# Patient Record
Sex: FEMALE | Race: WHITE
[De-identification: names, ages, dates, MRNs, and addresses within clinical notes are randomized per-mention and may not be internally consistent; named-entity substitution may affect disease eponyms.]

---

## 2017-10-03 ENCOUNTER — HOSPITAL ENCOUNTER (OUTPATIENT)
Dept: HOSPITAL 47 - RADMAMWWP | Age: 52
Discharge: HOME | End: 2017-10-03
Payer: COMMERCIAL

## 2017-10-03 ENCOUNTER — HOSPITAL ENCOUNTER (OUTPATIENT)
Dept: HOSPITAL 47 - RADUSWWP | Age: 52
Discharge: HOME | End: 2017-10-03
Payer: COMMERCIAL

## 2017-10-03 DIAGNOSIS — Z88.0: ICD-10-CM

## 2017-10-03 DIAGNOSIS — Z12.31: Primary | ICD-10-CM

## 2017-10-03 DIAGNOSIS — R94.5: Primary | ICD-10-CM

## 2017-10-03 PROCEDURE — 76705 ECHO EXAM OF ABDOMEN: CPT

## 2017-10-03 NOTE — US
EXAMINATION TYPE: US liver

 

DATE OF EXAM: 10/3/2017

 

COMPARISON: NONE

 

CLINICAL HISTORY: R94.5 ELevated liver function test. Elevated LFT's

 

EXAM MEASUREMENTS:

 

Liver Length:  14.2 cm   

Gallbladder Wall:  0.2 cm   

CBD:  0.4 cm

Right Kidney:  11.3 x 4.7 x 4.3 cm

 

 

 

Pancreas:  wnl, tail obscured by bowel gas

Liver:  Difficult to penetrates, bright echotexture, heterogeneous   

Gallbladder:  wnl

**Evidence for sonographic Rosario's sign:  No

CBD:  wnl 

Right Kidney:  wnl 

 

 

 

IMPRESSION: 

1. Liver is heterogeneous in pattern correlate for hepatitis, hepatocellular disease or fatty infiltr
ation.

## 2017-10-04 NOTE — MM
Reason for exam: screening  (asymptomatic).



History:

Family history of breast cancer in paternal aunt and breast cancer in paternal 

grandmother.



Physical Findings:

A clinical breast exam by your physician is recommended on an annual basis and 

results should be correlated with mammographic findings.



MG Screening Mammo w CAD

Bilateral CC and MLO view(s) were taken.

No prior studies available for comparison.

The breast tissue is heterogeneously dense. This may lower the sensitivity of 

mammography.  There is a 6.8mm mass in the upper outer quadrant on right breast 

8-9cm from nipple at middle depth. A 4mm mass in the lowe inner quadrant on left 

breast 6cm from nipple at middle depth.





ASSESSMENT: Incomplete: need additional imaging evaluation, BI-RAD 0



RECOMMENDATION:

Special view mammogram of both breasts.



If lesion persists on supplemental views, image directed ultrasound is 

recommended.



[facility] will attempt to contact patient to return for supplemental 

views and ultrasound if indicated.

## 2017-10-16 ENCOUNTER — HOSPITAL ENCOUNTER (OUTPATIENT)
Dept: HOSPITAL 47 - RADMAMWWP | Age: 52
End: 2017-10-16
Payer: COMMERCIAL

## 2017-10-16 DIAGNOSIS — R92.8: Primary | ICD-10-CM

## 2017-10-16 PROCEDURE — 76642 ULTRASOUND BREAST LIMITED: CPT

## 2017-10-16 NOTE — USB
Reason for exam: additional evaluation requested from abnormal screening.



History:

Family history of breast cancer in paternal aunt and breast cancer in paternal 

grandmother.



US Breast Workup Limited LORI

Technologist: Suze Padilla

Right breast ultrasound demonstrates a 0.7 x 0.6 x 0.9cm oval, cystic cluster at 9

o'clock versus complex cyst for which a 6 month follow up is recommended, 

corresponds to the mammographic findings, a 0.7 x 0.4 x 0.8cm oval, benign, cystic

lesion at 10 o'clock and a 0.2 x 0.2 x 0.3cm oval lesion too small to 

characterize at 12 o'clock.



Left breast ultrasound demonstrates a 0.6 x 0.3 x 0.6cm oval, circumscribed, 

hypoechoic lesion at 8 o'clock, possibly cystic with internal debris for which a 6

month follow up is recommended, corresponds to the mammographic findings.



These results were verbally communicated with the patient and result sheet given 

to the patient on 10/16/17.





ASSESSMENT: Probably benign, BI-RAD 3



RECOMMENDATION:

Follow-up diagnostic mammogram of both breasts in 6 months.

## 2017-10-16 NOTE — MM
Reason for exam: additional evaluation requested from abnormal screening.

Last mammogram was performed less than 1 month ago.



History:

Family history of breast cancer in paternal aunt and breast cancer in paternal 

grandmother.



Physical Findings:

Nurse did not find any significant physical abnormalities on exam.



MG Work Up Mamm w CAD BILAT

Bilateral spot compression CC, spot compression MLO, and LM view(s) were taken.

Prior study comparison: October 3, 2017, bilateral MG screening mammo w CAD.

The breast tissue is heterogeneously dense. This may lower the sensitivity of 

mammography.  On the right, 7mm nodularity persists at a middle depth. On the 

left, 7mm ovoid nodularity persists medially on the CC view, possible in the lower

inner quadrant based on the spot MLO view.



These results were verbally communicated with the patient and result sheet given 

to the patient on 10/16/17.





ASSESSMENT: Incomplete: need additional imaging evaluation, BI-RAD 0



RECOMMENDATION:

Ultrasound of both breasts.

(right upper outer quadrant, left medial half)

## 2018-01-05 ENCOUNTER — HOSPITAL ENCOUNTER (INPATIENT)
Dept: HOSPITAL 47 - 3MHU | Age: 53
LOS: 5 days | Discharge: HOME | DRG: 885 | End: 2018-01-10
Payer: MEDICAID

## 2018-01-05 VITALS — BODY MASS INDEX: 27.2 KG/M2

## 2018-01-05 DIAGNOSIS — Z91.5: ICD-10-CM

## 2018-01-05 DIAGNOSIS — Z81.8: ICD-10-CM

## 2018-01-05 DIAGNOSIS — F41.1: ICD-10-CM

## 2018-01-05 DIAGNOSIS — Z79.51: ICD-10-CM

## 2018-01-05 DIAGNOSIS — Z79.899: ICD-10-CM

## 2018-01-05 DIAGNOSIS — R45.851: ICD-10-CM

## 2018-01-05 DIAGNOSIS — R00.1: ICD-10-CM

## 2018-01-05 DIAGNOSIS — F10.10: ICD-10-CM

## 2018-01-05 DIAGNOSIS — I10: ICD-10-CM

## 2018-01-05 DIAGNOSIS — K21.9: ICD-10-CM

## 2018-01-05 DIAGNOSIS — G43.909: ICD-10-CM

## 2018-01-05 DIAGNOSIS — F41.0: ICD-10-CM

## 2018-01-05 DIAGNOSIS — Z88.0: ICD-10-CM

## 2018-01-05 DIAGNOSIS — F33.2: Primary | ICD-10-CM

## 2018-01-05 PROCEDURE — 84443 ASSAY THYROID STIM HORMONE: CPT

## 2018-01-06 RX ADMIN — BUTALBITAL, ACETAMINOPHEN, AND CAFFEINE PRN EACH: 50; 325; 40 TABLET ORAL at 22:24

## 2018-01-06 RX ADMIN — PANTOPRAZOLE SODIUM PRN MG: 40 TABLET, DELAYED RELEASE ORAL at 15:36

## 2018-01-06 RX ADMIN — ACETAMINOPHEN PRN MG: 325 TABLET, FILM COATED ORAL at 22:31

## 2018-01-06 RX ADMIN — ACETAMINOPHEN PRN MG: 325 TABLET, FILM COATED ORAL at 01:58

## 2018-01-06 RX ADMIN — FLUTICASONE PROPIONATE SCH SPRAY: 50 SPRAY, METERED NASAL at 21:39

## 2018-01-06 RX ADMIN — BUTALBITAL, ACETAMINOPHEN, AND CAFFEINE PRN EACH: 50; 325; 40 TABLET ORAL at 14:56

## 2018-01-06 RX ADMIN — BISOPROLOL FUMARATE AND HYDROCHLOROTHIAZIDE SCH EACH: 6.25; 1 TABLET ORAL at 15:34

## 2018-01-06 RX ADMIN — ACETAMINOPHEN PRN MG: 325 TABLET, FILM COATED ORAL at 12:22

## 2018-01-06 NOTE — CONS
CONSULTATION



DATE OF CONSULTATION:

01/06/2018.



HISTORY OF PRESENT ILLNESS:

This 52-year-old woman who was admitted to [facility] with overdose of 
multiple

medications also had depression.  Patient complains of severe headache at this 
time.

The patient also complaining of hypertension.  Blood pressure is  Patient being

followed by Dr. Pop in the outpatient setting.  There is no history of fever, 
rigors

or chills.  No history of headache, loss of consciousness or seizures.



PAST MEDICAL:

Hypertension, history of migraines, headaches.



MEDICATIONS ARE:

Previous medications are:

1. Vistaril 25 mg q.6 hours.

2. Buspar 10 mg daily.

3. Prilosec 20 mg b.i.d. p.r.n.

4. Flonase 1 spray b.i.d.

5. Lexapro 20 mg daily.

6. Ziac 1 p.o. daily.



ALLERGIES:

None.



FAMILY HISTORY:

No history of heart disease or strokes in the family.



SOCIAL HISTORY:

No history of smoking, but occasional alcohol intake.  History of binge 
drinking.



REVIEW OF SYSTEMS:

ENT: No diminished vision. No diminished hearing.  Cardiovascular: S1, S2 
muffled.

Respiratory: As mentioned earlier.  GI: As mentioned earlier. : No dysuria.

Allergy/Immunology: No asthma or hayfever.  Musculoskeletal: As mentioned 
earlier.

Hematology/Oncology: No history of anemia.  Endocrine: No history of diabetes or

hypothyroidism.  Constitutional: As mentioned earlier.  Dermatology: Negative.

Rheumatology: Negative.  Psychiatric: As mentioned earlier.



PHYSICAL EXAMINATION:

The patient is alert and oriented times three.  Pulse is 49, blood pressure 120/
77,

respiration 17, temperature 98 degrees, pulse ox normal.  HEENT:  Conjunctivae 
normal.

Oral mucosa moist.  Neck is no jugular venous distention.  No carotid bruit. No 
lymph

nodes enlargement. Cardiovascular system:  S1 S2. No S3, no S4.  Respiratory: 
Breath

sounds diminished in the bases.  No rhonchi and no crackles.

ABDOMEN:  Soft, nontender.  No mass palpable. Legs no edema and no swelling.  
Central

nervous system: Higher functions as mentioned earlier, moves all 4 limbs, no 
edema.  No

focal motor or sensory deficits. Lymphatics: No lymph nodes palpable in the neck
,

axillae or groin.

SKIN:  No ulcer, rash or bleeding.



LABS:

Pending at this time.



ASSESSMENT:

1. Status post overdose of multiple medications.

2. Depression.

3. Hypertension.

4. Headaches, possibly migraine.

5. Bradycardia.



RECOMMENDATIONS AND DISCUSSION:

In this 52-year-old woman who presented with multiple complex medical issues.  
At this

time I recommend to continue current management and symptomatic treatment. 
Otherwise at

this time I recommend to monitor blood pressure closely.  Resume the home 
medications.

Otherwise,  I would also recommend  Fioricet for headaches.  Basic labs.  EKG 
for sinus

bradycardia.  Otherwise we will follow the patient closely with you.



Thank you Dr. Moreno for letting us participate in the care of this patient.





MMFERL / MARIA ESTHERN: [id number] / Job#: [id number]

[email or web address]
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
## 2018-01-07 RX ADMIN — FLUTICASONE PROPIONATE SCH SPRAY: 50 SPRAY, METERED NASAL at 20:53

## 2018-01-07 RX ADMIN — BISOPROLOL FUMARATE AND HYDROCHLOROTHIAZIDE SCH: 6.25; 1 TABLET ORAL at 13:52

## 2018-01-07 RX ADMIN — FLUTICASONE PROPIONATE SCH SPRAY: 50 SPRAY, METERED NASAL at 08:43

## 2018-01-07 RX ADMIN — BUTALBITAL, ACETAMINOPHEN, AND CAFFEINE PRN EACH: 50; 325; 40 TABLET ORAL at 08:43

## 2018-01-07 RX ADMIN — SERTRALINE HYDROCHLORIDE SCH MG: 50 TABLET, FILM COATED ORAL at 08:43

## 2018-01-07 RX ADMIN — BUTALBITAL, ACETAMINOPHEN, AND CAFFEINE PRN EACH: 50; 325; 40 TABLET ORAL at 15:56

## 2018-01-07 RX ADMIN — DOXEPIN HYDROCHLORIDE SCH MG: 10 CAPSULE ORAL at 20:54

## 2018-01-08 RX ADMIN — ACETAMINOPHEN PRN MG: 325 TABLET, FILM COATED ORAL at 15:03

## 2018-01-08 RX ADMIN — SERTRALINE HYDROCHLORIDE SCH MG: 50 TABLET, FILM COATED ORAL at 09:02

## 2018-01-08 RX ADMIN — BISOPROLOL FUMARATE AND HYDROCHLOROTHIAZIDE SCH: 6.25; 1 TABLET ORAL at 09:01

## 2018-01-08 RX ADMIN — FLUTICASONE PROPIONATE SCH SPRAY: 50 SPRAY, METERED NASAL at 09:02

## 2018-01-08 RX ADMIN — BUTALBITAL, ACETAMINOPHEN, AND CAFFEINE PRN EACH: 50; 325; 40 TABLET ORAL at 19:09

## 2018-01-08 RX ADMIN — PANTOPRAZOLE SODIUM PRN MG: 40 TABLET, DELAYED RELEASE ORAL at 09:02

## 2018-01-08 RX ADMIN — BUTALBITAL, ACETAMINOPHEN, AND CAFFEINE PRN EACH: 50; 325; 40 TABLET ORAL at 09:03

## 2018-01-08 RX ADMIN — FLUTICASONE PROPIONATE SCH SPRAY: 50 SPRAY, METERED NASAL at 20:46

## 2018-01-08 RX ADMIN — DOXEPIN HYDROCHLORIDE SCH MG: 10 CAPSULE ORAL at 20:46

## 2018-01-08 NOTE — P.PN
Progress Note - Text





Interval history: The patient is found at the  she follows me to an 

interview room.  She reports she still has depressed mood with anxiety symptoms 

and hopelessness thinking.  She feels the doxepin did help somewhat with sleep 

in that she still frequently woke but she was able to fall sleep quicker.  

Staff reported that she slept 6 hours she states she would agree although it 

was fragmented.  Appetite is stable.  She is troubled by trying to plan for her 

future.  Financially she now she needs to get another job but also has an 

interest in going back to school as she has tried that several times but did 

not complete.  She reports attending groups.





Mental status exam: The patient is alert she is dressed in her own clothing and 

a hospital gown.  Eye contact is appropriate she is cooperative.  She endorses 

a depressed mood with hopelessness thinking affect is constricted.  She feels 

safe in the hospital and does not feel she would harm herself here she is 

endorsing no homicidal ideation.  She is endorsing no auditory or visual 

hallucinations or specific delusions there is no evidence of hypomania or 

joe.  Insight and judgment grossly intact.  She is oriented to person place 

and date.  She demonstrates no verbal or physical aggressiveness.





Plan: The patient will continue on the Zoloft we will plan to titrate this to 

100 mg beginning tomorrow.  Continue doxepin is written.  We will continue to 

monitor for safety and encourage her full participation in the milieu.  The 

patient reports having a pleasant conversation with her daughter last evening.  

Social work will be asked to arrange a support meeting.  Vital signs reviewed 

she does demonstrate bradycardia she states that this is been going on for 

quite some time she endorses no dizziness.

## 2018-01-09 VITALS — TEMPERATURE: 98.5 F | RESPIRATION RATE: 16 BRPM

## 2018-01-09 RX ADMIN — FLUTICASONE PROPIONATE SCH SPRAY: 50 SPRAY, METERED NASAL at 08:48

## 2018-01-09 RX ADMIN — BUTALBITAL, ACETAMINOPHEN, AND CAFFEINE PRN EACH: 50; 325; 40 TABLET ORAL at 08:48

## 2018-01-09 RX ADMIN — BUTALBITAL, ACETAMINOPHEN, AND CAFFEINE PRN EACH: 50; 325; 40 TABLET ORAL at 16:17

## 2018-01-09 RX ADMIN — SERTRALINE HYDROCHLORIDE SCH MG: 100 TABLET ORAL at 08:48

## 2018-01-09 RX ADMIN — FLUTICASONE PROPIONATE SCH SPRAY: 50 SPRAY, METERED NASAL at 20:48

## 2018-01-09 RX ADMIN — BISOPROLOL FUMARATE AND HYDROCHLOROTHIAZIDE SCH EACH: 6.25; 1 TABLET ORAL at 08:48

## 2018-01-09 NOTE — P.PN
Progress Note - Text





Interval history: The patient is found in group she follows me to an interview 

room.  She reports experiencing some anxiety at times which appear to be less 

than panic attacks.  We discussed strategies and coping skills to manage those.

  She continues to describe her disappointment in that she has to live with her 

mother she has no transportation or job.  She demonstrates future oriented 

thinking as to how she will accomplish these tasks.  She has been attending 

groups.  We again discussed her psychotropic medication.  She feels that 

doxepin has provided some relief for sleep but she had difficulty last evening 

sleeping.





Mental status exam: The patient is alert she is dressed in hospital attire.  

Eye contact is appropriate speech is fluent spontaneous nonpressured.  She 

states her mood has been anxious.  She feels depression is improving.  She is 

reporting no suicidal or homicidal ideation intent or plan.  She is endorsing 

no symptoms of psychosis and she does not appear hypomanic or manic.  There is 

no evidence of tangential thinking loose associations or flight of ideas.  

Insight and judgment improving.  She remains oriented to person place and date.





Plan: The patient will continue on her current medications I will titrate the 

doxepin to 20 mg at bedtime.  We discussed the plan of discharging her from the 

mental health unit tomorrow to begin partial hospitalization at [facility] Thursday.  We discussed that as a treatment option and she is 

agreeable.  We will continue to monitor her for safety.  Vital signs reviewed.

## 2018-01-10 VITALS — DIASTOLIC BLOOD PRESSURE: 65 MMHG | SYSTOLIC BLOOD PRESSURE: 105 MMHG | HEART RATE: 53 BPM

## 2018-01-10 RX ADMIN — BUTALBITAL, ACETAMINOPHEN, AND CAFFEINE PRN EACH: 50; 325; 40 TABLET ORAL at 08:35

## 2018-01-10 RX ADMIN — BISOPROLOL FUMARATE AND HYDROCHLOROTHIAZIDE SCH: 6.25; 1 TABLET ORAL at 08:33

## 2018-01-10 RX ADMIN — FLUTICASONE PROPIONATE SCH SPRAY: 50 SPRAY, METERED NASAL at 08:32

## 2018-01-10 RX ADMIN — SERTRALINE HYDROCHLORIDE SCH MG: 100 TABLET ORAL at 08:33

## 2018-01-10 NOTE — P.DS
Providers


Date of admission: 


01/06/18 00:13





Expected date of discharge: 01/10/18


Attending physician: 


Zacarias Moreno





Consults: 





 





01/06/18 01:50


Consult Physician Routine 


   Consulting Provider: Drew Betancur


   Consult Reason/Comments: medical management


   Do you want consulting provider notified?: Yes, Notify in am











Primary care physician: 


Stated None








- Discharge Diagnosis(es)


(1) Major depressive disorder, recurrent severe without psychotic features


Current Visit: Yes   Status: Acute   Priority: High   





(2) Alcohol use disorder


Current Visit: Yes   Status: Acute   Priority: High   


Hospital Course: 





Brief summary of admission note: This patient is a 52-year-old  

 female who was admitted to the mental health unit from [facility] 

after attempting suicide via medication overdose.  She reportedly overdosed 

with Lexapro Vistaril and BuSpar.  The patient reported she was feeling 

overwhelmed.  Recently she had broken up with her fianc and this led to her 

losing her job housing and her vehicle.  She reported impaired sleep and 

appetite energy and was feeling hopeless.  She endorsed generalized anxiety 

symptoms as well as panic attacks.  For full details please refer to my 

psychiatric evaluation dated 01/06/2018.





Summary of hospital course: The patient was admitted to the mental health unit 

she signed in voluntarily.  We reviewed her presenting symptoms and medication 

options.  Be initiated Zoloft and titrated that 100 mg daily.  Vistaril was 

used as needed for anxiety doxepin was used for sleep disturbance.  The patient 

attended group she demonstrated no agitated behavior.  She reported a 

progressive improvement of her symptoms while here.  She still is experiencing 

some anxiety related to her current living situation and lack of financial 

resources but states her suicidal ideation has resolved.  We discussed a plan 

of having her transition to the [facility] program after 

discharge and she is agreeable.





Mental status exam: The patient is alert she seated calmly she is dressed in 

her own clothing today.  Hygiene grooming are adequate.  She reports her moods 

improved.  She is reporting no suicidal or homicidal ideation intent or plan.  

She does have some ongoing symptoms of anxiety.  She is endorsing no auditory 

or visual hallucinations or any specific delusions.  There is no observable 

evidence of psychosis.  Thought process is linear she demonstrates no 

tangential thinking loose associations or flight of ideas she does not appear 

hypomanic or manic.  Her affect is appropriately expressive and she 

demonstrates an appropriate range of affect.  She is oriented to person place 

and date.  She demonstrates no verbal or physical aggressiveness.  She 

demonstrates future oriented thinking.





Impressions


1.  Major depressive disorder recurrent severe without psychosis, alcohol use 

disorder, rule out generalized anxiety disorder


2.  Rule out cluster B traits


3.  Previous reported history of hypertension gastric receptor reflux disease 

and migraines





Plan: The patient will be discharged mental health unit today.  She will return 

residing with her mother.  We have made arrangements for her to begin the 

[facility] program at [facility] for continued care.  There is no 

imminent safety risk she does not require continued inpatient psychiatric 

hospitalization.  We will continue Zoloft 100 mg daily, doxepin 20 mg at 

bedtime if needed, Vistaril 25 mg up to twice daily if needed for anxiety.  She 

will follow-up with her primary care physician as needed.  She is instructed to 

abstain from use of alcohol or any other illicit drug as use of these 

substances would elevate her safety risk.  She does not wish to attend 

inpatient chemical dependency treatment at this time.  She will continue to 

address substance use issues as an outpatient.  She is instructed to return to 

the hospital with any acute safety concerns.





Plan - Discharge Summary


New Discharge Prescriptions: 


New


   Acetaminophen Tab [Tylenol] 650 mg PO Q4HR PRN  tab


     PRN Reason: Pain/Discomfort


   Doxepin [SINEquan] 20 mg PO HS #30 cap


   hydrOXYzine PAMOATE [Vistaril] 25 mg PO BID PRN #30 cap


     PRN Reason: Anxiety


   Sertraline [Zoloft] 100 mg PO DAILY #30 tab





Continue


   Bisoprolol-Hctz 10-6.25 mg [Ziac 10-6.25 MG] 1 tab PO DAILY


   Omeprazole [PriLOSEC] 20 mg PO AC-BID PRN


     PRN Reason: Heartburn


   Fluticasone Nasal Spray [Flonase Nasal Spray] 1 spray EA NOSTRIL BID





Discontinued


   hydrOXYzine PAMOATE [Vistaril] 25 mg PO HS


   busPIRone HCl [Buspar] 10 mg PO TID


   Escitalopram [Lexapro] 20 mg PO DAILY


Discharge Medication List





Bisoprolol-Hctz 10-6.25 mg [Ziac 10-6.25 MG] 1 tab PO DAILY 01/06/18 [History]


Fluticasone Nasal Spray [Flonase Nasal Spray] 1 spray EA NOSTRIL BID 01/06/18 [

History]


Omeprazole [PriLOSEC] 20 mg PO AC-BID PRN 01/06/18 [History]


Acetaminophen Tab [Tylenol] 650 mg PO Q4HR PRN  tab 01/10/18 [Rx]


Doxepin [SINEquan] 20 mg PO HS #30 cap 01/10/18 [Rx]


Sertraline [Zoloft] 100 mg PO DAILY #30 tab 01/10/18 [Rx]


hydrOXYzine PAMOATE [Vistaril] 25 mg PO BID PRN #30 cap 01/10/18 [Rx]








Follow up Appointment(s)/Referral(s): 


intake,intake [Other] - 01/11/18 8:00 am

## 2018-04-20 ENCOUNTER — HOSPITAL ENCOUNTER (OUTPATIENT)
Dept: HOSPITAL 47 - RADMAMWWP | Age: 53
Discharge: HOME | End: 2018-04-20
Payer: COMMERCIAL

## 2018-04-20 DIAGNOSIS — R92.8: Primary | ICD-10-CM

## 2018-04-20 PROCEDURE — 76641 ULTRASOUND BREAST COMPLETE: CPT

## 2018-04-20 PROCEDURE — 77066 DX MAMMO INCL CAD BI: CPT

## 2018-04-20 NOTE — MM
Reason for exam: follow-up at short interval from prior study.

Last mammogram was performed 6 months ago.



History:

Family history of breast cancer in paternal aunt and breast cancer in paternal 

grandmother.



Physical Findings:

Nurse did not find any significant physical abnormalities on exam.



MG 3D Diag Mammo W/Cad LORI

Bilateral CC and MLO view(s) were taken.

Prior study comparison: October 16, 2017, bilateral MG work up mamm w CAD BILAT.  

October 3, 2017, bilateral MG screening mammo w CAD.

The breast tissue is heterogeneously dense. This may lower the sensitivity of 

mammography.  There is chronic nodularity bilaterally.



These results were verbally communicated with the patient and result sheet given 

to the patient on 4/20/18.





ASSESSMENT: Benign, BI-RAD 2



RECOMMENDATION:

Routine screening mammogram of both breasts in 1 year.

## 2018-04-20 NOTE — USB
Reason for exam: follow-up at short interval from prior study.



History:

Family history of breast cancer in paternal aunt and breast cancer in paternal 

grandmother.



US Breast BILAT

Right breast ultrasound includes all four quadrants, the retroareolar region and 

axilla. Finding demonstrates a 0.3 x 0.3 x 0.3cm lesion too small to characterize 

at 12 o'clock, a 0.6 x 0.4 x 0.4cm lesion too small to characterize at 8 o'clock, 

a 0.3 x 0.3 x 0.3cm lesion too small to characterize at 8 o'clock, a 0.9 x 0.5 x 

1.0cm stable cystic cluster at 9 o'clock and a 0.4 x 0.2 x 0.6cm cystic lesion at 

10 o'clock.



Left breast ultrasound includes all four quadrants, the retroareolar region and 

axilla. Finding demonstrates a 0.6 x 0.3 x 0.4cm lesion too small to characterize 

at 8 o'clock.



These results were verbally communicated with the patient and result sheet given 

to the patient on 4/20/18.





ASSESSMENT: Benign, BI-RAD 2



RECOMMENDATION:

Routine screening mammogram of both breasts in 1 year.

## 2018-04-24 ENCOUNTER — HOSPITAL ENCOUNTER (INPATIENT)
Dept: HOSPITAL 47 - EC | Age: 53
LOS: 6 days | Discharge: NURSING, REHAB OR LONG-TERM CARE | DRG: 494 | End: 2018-04-30
Payer: COMMERCIAL

## 2018-04-24 DIAGNOSIS — H00.019: ICD-10-CM
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
## 2018-04-24 DIAGNOSIS — F17.210: ICD-10-CM
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
## 2018-04-24 DIAGNOSIS — I10: ICD-10-CM

## 2018-04-24 DIAGNOSIS — F31.9: ICD-10-CM

## 2018-04-24 DIAGNOSIS — Z88.0: ICD-10-CM

## 2018-04-24 DIAGNOSIS — S82.852A: Primary | ICD-10-CM

## 2018-04-24 DIAGNOSIS — Z79.899: ICD-10-CM

## 2018-04-24 PROCEDURE — 82306 VITAMIN D 25 HYDROXY: CPT

## 2018-04-24 PROCEDURE — 85025 COMPLETE CBC W/AUTO DIFF WBC: CPT

## 2018-04-24 PROCEDURE — 80053 COMPREHEN METABOLIC PANEL: CPT

## 2018-04-24 PROCEDURE — 86900 BLOOD TYPING SEROLOGIC ABO: CPT

## 2018-04-24 PROCEDURE — 86850 RBC ANTIBODY SCREEN: CPT

## 2018-04-24 PROCEDURE — 99152 MOD SED SAME PHYS/QHP 5/>YRS: CPT

## 2018-04-24 PROCEDURE — 96374 THER/PROPH/DIAG INJ IV PUSH: CPT

## 2018-04-24 PROCEDURE — 85610 PROTHROMBIN TIME: CPT

## 2018-04-24 PROCEDURE — 96375 TX/PRO/DX INJ NEW DRUG ADDON: CPT

## 2018-04-24 PROCEDURE — 27818 TREATMENT OF ANKLE FRACTURE: CPT

## 2018-04-24 PROCEDURE — 2W6RXZZ TRACTION OF LEFT LOWER LEG: ICD-10-PCS

## 2018-04-24 PROCEDURE — 85730 THROMBOPLASTIN TIME PARTIAL: CPT

## 2018-04-24 PROCEDURE — 99153 MOD SED SAME PHYS/QHP EA: CPT

## 2018-04-24 PROCEDURE — 86901 BLOOD TYPING SEROLOGIC RH(D): CPT

## 2018-04-24 PROCEDURE — 81025 URINE PREGNANCY TEST: CPT

## 2018-04-24 PROCEDURE — 99285 EMERGENCY DEPT VISIT HI MDM: CPT

## 2018-04-24 PROCEDURE — 96376 TX/PRO/DX INJ SAME DRUG ADON: CPT

## 2018-04-24 RX ADMIN — KETAMINE HYDROCHLORIDE ONE MG: 10 INJECTION, SOLUTION INTRAMUSCULAR; INTRAVENOUS at 23:34

## 2018-04-24 RX ADMIN — KETAMINE HYDROCHLORIDE ONE MG: 10 INJECTION, SOLUTION INTRAMUSCULAR; INTRAVENOUS at 22:56

## 2018-04-24 RX ADMIN — KETAMINE HYDROCHLORIDE ONE MG: 10 INJECTION, SOLUTION INTRAMUSCULAR; INTRAVENOUS at 23:38

## 2018-04-24 RX ADMIN — MORPHINE SULFATE PRN MG: 5 INJECTION, SOLUTION INTRAVENOUS at 23:52

## 2018-04-24 NOTE — XR
EXAMINATION TYPE: XR ankle limited LT

 

DATE OF EXAM: 4/24/2018

 

COMPARISON: NONE

 

HISTORY: Ankle pain

 

TECHNIQUE: 2 views

 

FINDINGS: There is a comminuted fracture of the ankle joint involving the distal fibula and tibia. Th
ere is a posterior partial dislocation of the talus. There is probably a large fragment of the poster
ior malleolus. There is a transverse fracture of the medial malleolus which shows lateral displacemen
t. This is adjacent to the talus.

 

IMPRESSION: There is a comminuted trimalleolar posterior fracture dislocation of the ankle joint.

## 2018-04-24 NOTE — ED
General Adult HPI





<Antonio Biggs - Last Filed: 04/25/18 00:43>





- General


Source: patient, RN notes reviewed


Mode of arrival: EMS


Limitations: physical limitation





<Alo Levin - Last Filed: 04/25/18 01:00>





- General


Chief complaint: Extremity Injury, Lower


Stated complaint: FALL,ANKLE INJURY


Time Seen by Provider: 04/24/18 22:22





- History of Present Illness


Initial comments: 





Patient is a 52-year-old female presenting to the emergency room today with 

chief complaint of a injury to the left ankle.  She does admit that she was at 

work.  She states that a [employer] was coming she went to step back forgot that 

there was a pallet there and twisted and fell backwards.  She doesn't pain 

locally to left ankle.  EMS was called they did give pain medication.  Currently

, what this time does not want any more pain medicine at this time.  Patient 

states that she has sensation.  She denies any other injury or complaint. 

Patient denies any recent fever, chills, shortness of breath, chest pain, back 

pain, abdominal pain, nausea or vomiting, numbness or tingling, headaches or 

visual changes, or any other complaints. (Alo Levin)





- Related Data


 Home Medications











 Medication  Instructions  Recorded  Confirmed


 


Omeprazole [PriLOSEC] 20 mg PO AC-BID PRN 01/06/18 04/24/18


 


QUEtiapine FUMARATE [SEROquel] 200 mg PO HS 04/24/18 04/24/18


 


Sertraline [Zoloft] 150 mg PO DAILY 04/24/18 04/24/18


 


hydrOXYzine PAMOATE [Vistaril] 25 mg PO BID 04/24/18 04/24/18











 Allergies











Allergy/AdvReac Type Severity Reaction Status Date / Time


 


Penicillins AdvReac  Rash/Hives Verified 04/24/18 23:20














Review of Systems


ROS Other: All systems not noted in ROS Statement are negative.





<Antonio Biggs - Last Filed: 04/25/18 00:43>


ROS Other: All systems not noted in ROS Statement are negative.





<Alo Levin - Last Filed: 04/25/18 01:00>


ROS Statement: 


Those systems with pertinent positive or pertinent negative responses have been 

documented in the HPI.








Past Medical History


Past Medical History: Hypertension


History of Any Multi-Drug Resistant Organisms: None Reported


Additional Past Surgical History / Comment(s): umblical surgery repair.


Past Psychological History: Bipolar


Smoking Status: Current some day smoker


Past Alcohol Use History: Occasional


Past Drug Use History: None Reported





<Alo Levin - Last Filed: 04/25/18 01:00>





General Exam





<Antonio Biggs - Last Filed: 04/25/18 00:43>


Limitations: physical limitation





<Alo Levin - Last Filed: 04/25/18 01:00>





- General Exam Comments


Initial Comments: 





General:  The patient is awake and alert, in no distress, and does not appear 

acutely ill.   


Neck:  The neck is supple.  


Cardiovascular:  There is a regular rate and rhythm. No murmur, rub or gallop 

is appreciated.


Respiratory:  Lungs are clear to auscultation, respirations are non-labored, 

breath sounds are equal.  No wheezes, stridor, rales, or rhonchi.


Musculoskeletal: Patient does have obvious deformity with of the left ankle 

with lateral deviation.  Patient has no tenderness to the right knee or over 

the fibular head.  Sensations are intact to light touch.  Pulses 2+.


Neurological:  A&O x 3. CN II-XII intact, There are no obvious motor or sensory 

deficits. Coordination appears grossly intact. Speech is normal.


Skin:  Skin is warm and dry and no rashes or lesions are noted. 


Psychiatric:  Normal mood and affect.   (Alo Levin)


 Vital Signs











  04/24/18 04/24/18 04/24/18





  22:20 22:55 22:59


 


Temperature 97.5 F L  


 


Pulse Rate 63 67 73


 


Respiratory 18 18 18





Rate   


 


Blood Pressure 177/91 162/95 187/100


 


O2 Sat by Pulse 100 100 100





Oximetry   














  04/24/18 04/24/18 04/24/18





  23:03 23:07 23:19


 


Temperature   


 


Pulse Rate 79 86 63


 


Respiratory 18 18 18





Rate   


 


Blood Pressure 180/95 193/103 175/84


 


O2 Sat by Pulse 100 100 100





Oximetry   














  04/24/18 04/24/18 04/24/18





  23:27 23:33 23:36


 


Temperature   


 


Pulse Rate 60 60 72


 


Respiratory 18 18 18





Rate   


 


Blood Pressure 192/91 176/81 174/98


 


O2 Sat by Pulse 100 100 100





Oximetry   














  04/24/18 04/24/18 04/24/18





  23:39 23:48 23:57


 


Temperature   


 


Pulse Rate 87 81 73


 


Respiratory 20 18 18





Rate   


 


Blood Pressure 210/121 181/113 190/97


 


O2 Sat by Pulse 100 100 100





Oximetry   














Procedures





- Orthopedic Fracture Reduction


  ** Fracture #1


Consent Obtained: written consent


Time Out Performed: Yes


Side: left


Fracture Reduction Location: tibia, fibula


Analgesia: procedural sedation


Technique: direct manipulation, traction/counter-traction


Post Reduction X-rays Demonstrate: acceptable reduction


Post-Reduction Neuro Exam: intact


Post-Reduction Vascular Exam: intact


Splint Applied: Yes


Patient Tolerated Procedure: well





- Orthopedic Splinting/Casting


  ** Injury #1


Side: left


Lower Extremity Injury Location: ankle


Lower Extremity Immobilizer: posterior splint, stirrup splint





- Procedural Sedation


Procedural Sedation Start Time: 22:56


Procedural Sedation Stop Time: 23:55


Indications: fracture/dislocation reduction


ASA Class: I


Mallampati Airway Score: 1


Preparation: cardiac monitor applied, pulse oximeter, capnometry used, 

supplemental O2 applied, suction/airway equipment at bedside


Ketamine: IV


Ketamine Dose: 170


Complications: none


Patient Tolerated Procedure: well





<Antonio Biggs - Last Filed: 04/25/18 00:43>





<Alo Levin - Last Filed: 04/25/18 01:00>





- Orthopedic Fracture Reduction


  ** Fracture #1


Additional Comments: 





Patient has weak DP pulse, good cap refill, neurologically intact. (Antonio Biggs)





Medical Decision Making





<Antonio Biggs - Last Filed: 04/25/18 00:43>





<Alo Levin - Last Filed: 04/25/18 01:00>





- Medical Decision Making





Patient's x-rays do show a trimalleolar fracture or dislocation.  Reduction was 

attempted here in the emergency room with attending physician Dr. Biggs.  

Reduction was unsuccessful.  Case discussed with orthopedic on call Dr. Spencer who will admit the patient.  Patient will remain nothing by mouth. (

Alo Levin)





Disposition





<Antonio Biggs - Last Filed: 04/25/18 00:43>


Is patient prescribed a controlled substance at d/c from ED?: No


Time of Disposition: 00:42





<Alo Levin - Last Filed: 04/25/18 01:00>


Clinical Impression: 


 Ankle fracture





Disposition: ADMITTED AS IP TO THIS [facility]: Stable


Referrals: 


Sam Pop MD [Primary Care Provider] - 1-2 days

## 2018-04-24 NOTE — XR
EXAMINATION TYPE: XR tibia fibula LT

 

DATE OF EXAM: 4/24/2018

 

COMPARISON: NONE

 

HISTORY: Pain

 

TECHNIQUE: 4 views

 

FINDINGS: The knee joint is intact. There is a trimalleolar fracture of the ankle joint which is desc
ribed on the ankle x-ray report. There is posterior dislocation of the talus. There is no sign of any
 joint effusion.

 

IMPRESSION: Trimalleolar posterior fracture dislocation of the ankle joint. Normal knee joint.

## 2018-04-24 NOTE — XR
EXAMINATION TYPE: XR ankle limited LT

 

DATE OF EXAM: 4/24/2018

 

COMPARISON: Today

 

HISTORY: Post reduction

 

TECHNIQUE: 2 views.

 

FINDINGS:

2 views are obtained through the cast. There is a persistent posterior dislocation of the talus. Ther
e is slight improved position of the medial malleolus fracture fragment.

 

IMPRESSION: Slight reduction. There is still persistent significant posterior dislocation of the ankl
e joint. Trimalleolar fracture.

## 2018-04-25 LAB
ALBUMIN SERPL-MCNC: 4.2 G/DL (ref 3.5–5)
ALP SERPL-CCNC: 85 U/L (ref 38–126)
ALT SERPL-CCNC: 19 U/L (ref 9–52)
ANION GAP SERPL CALC-SCNC: 14 MMOL/L
APTT BLD: 24.1 SEC (ref 22–30)
AST SERPL-CCNC: 27 U/L (ref 14–36)
BASOPHILS # BLD AUTO: 0 K/UL (ref 0–0.2)
BASOPHILS # BLD AUTO: 0 K/UL (ref 0–0.2)
BASOPHILS NFR BLD AUTO: 0 %
BASOPHILS NFR BLD AUTO: 0 %
BUN SERPL-SCNC: 15 MG/DL (ref 7–17)
CALCIUM SPEC-MCNC: 9 MG/DL (ref 8.4–10.2)
CHLORIDE SERPL-SCNC: 103 MMOL/L (ref 98–107)
CO2 SERPL-SCNC: 23 MMOL/L (ref 22–30)
EOSINOPHIL # BLD AUTO: 0.2 K/UL (ref 0–0.7)
EOSINOPHIL # BLD AUTO: 0.2 K/UL (ref 0–0.7)
EOSINOPHIL NFR BLD AUTO: 2 %
EOSINOPHIL NFR BLD AUTO: 3 %
ERYTHROCYTE [DISTWIDTH] IN BLOOD BY AUTOMATED COUNT: 4.27 M/UL (ref 3.8–5.4)
ERYTHROCYTE [DISTWIDTH] IN BLOOD BY AUTOMATED COUNT: 4.67 M/UL (ref 3.8–5.4)
ERYTHROCYTE [DISTWIDTH] IN BLOOD: 13 % (ref 11.5–15.5)
ERYTHROCYTE [DISTWIDTH] IN BLOOD: 13.1 % (ref 11.5–15.5)
GLUCOSE SERPL-MCNC: 77 MG/DL (ref 74–99)
HCT VFR BLD AUTO: 38.3 % (ref 34–46)
HCT VFR BLD AUTO: 41.1 % (ref 34–46)
HGB BLD-MCNC: 12.9 GM/DL (ref 11.4–16)
HGB BLD-MCNC: 14 GM/DL (ref 11.4–16)
INR PPP: 1 (ref ?–1.2)
LYMPHOCYTES # SPEC AUTO: 1.3 K/UL (ref 1–4.8)
LYMPHOCYTES # SPEC AUTO: 2 K/UL (ref 1–4.8)
LYMPHOCYTES NFR SPEC AUTO: 22 %
LYMPHOCYTES NFR SPEC AUTO: 26 %
MCH RBC QN AUTO: 30 PG (ref 25–35)
MCH RBC QN AUTO: 30.3 PG (ref 25–35)
MCHC RBC AUTO-ENTMCNC: 33.8 G/DL (ref 31–37)
MCHC RBC AUTO-ENTMCNC: 34.1 G/DL (ref 31–37)
MCV RBC AUTO: 88.1 FL (ref 80–100)
MCV RBC AUTO: 89.6 FL (ref 80–100)
MONOCYTES # BLD AUTO: 0.4 K/UL (ref 0–1)
MONOCYTES # BLD AUTO: 0.5 K/UL (ref 0–1)
MONOCYTES NFR BLD AUTO: 6 %
MONOCYTES NFR BLD AUTO: 7 %
NEUTROPHILS # BLD AUTO: 4 K/UL (ref 1.3–7.7)
NEUTROPHILS # BLD AUTO: 4.8 K/UL (ref 1.3–7.7)
NEUTROPHILS NFR BLD AUTO: 64 %
NEUTROPHILS NFR BLD AUTO: 67 %
PLATELET # BLD AUTO: 202 K/UL (ref 150–450)
PLATELET # BLD AUTO: 237 K/UL (ref 150–450)
POTASSIUM SERPL-SCNC: 3.9 MMOL/L (ref 3.5–5.1)
PROT SERPL-MCNC: 7.2 G/DL (ref 6.3–8.2)
PT BLD: 9.9 SEC (ref 9–12)
SODIUM SERPL-SCNC: 140 MMOL/L (ref 137–145)
WBC # BLD AUTO: 6 K/UL (ref 3.8–10.6)
WBC # BLD AUTO: 7.6 K/UL (ref 3.8–10.6)

## 2018-04-25 PROCEDURE — 0QSH35Z REPOSITION LEFT TIBIA WITH EXTERNAL FIXATION DEVICE, PERCUTANEOUS APPROACH: ICD-10-PCS

## 2018-04-25 PROCEDURE — 0QSM35Z REPOSITION LEFT TARSAL WITH EXTERNAL FIXATION DEVICE, PERCUTANEOUS APPROACH: ICD-10-PCS

## 2018-04-25 RX ADMIN — ASPIRIN 325 MG ORAL TABLET SCH MG: 325 PILL ORAL at 20:45

## 2018-04-25 RX ADMIN — MORPHINE SULFATE PRN MG: 5 INJECTION, SOLUTION INTRAVENOUS at 04:10

## 2018-04-25 RX ADMIN — FENTANYL CITRATE ONE MCG: 50 INJECTION, SOLUTION INTRAMUSCULAR; INTRAVENOUS at 16:43

## 2018-04-25 RX ADMIN — HYDROCODONE BITARTRATE AND ACETAMINOPHEN PRN EACH: 5; 325 TABLET ORAL at 02:06

## 2018-04-25 RX ADMIN — HYDROCODONE BITARTRATE AND ACETAMINOPHEN PRN EACH: 5; 325 TABLET ORAL at 11:59

## 2018-04-25 RX ADMIN — CEFAZOLIN SCH GM: 10 INJECTION, POWDER, FOR SOLUTION INTRAVENOUS at 23:54

## 2018-04-25 RX ADMIN — MORPHINE SULFATE PRN MG: 5 INJECTION, SOLUTION INTRAVENOUS at 00:23

## 2018-04-25 RX ADMIN — POTASSIUM CHLORIDE SCH MLS/HR: 14.9 INJECTION, SOLUTION INTRAVENOUS at 17:24

## 2018-04-25 RX ADMIN — CEFAZOLIN SCH GM: 10 INJECTION, POWDER, FOR SOLUTION INTRAVENOUS at 17:23

## 2018-04-25 RX ADMIN — MORPHINE SULFATE PRN MG: 5 INJECTION, SOLUTION INTRAVENOUS at 08:22

## 2018-04-25 RX ADMIN — FENTANYL CITRATE ONE MCG: 50 INJECTION, SOLUTION INTRAMUSCULAR; INTRAVENOUS at 16:11

## 2018-04-25 RX ADMIN — MORPHINE SULFATE PRN MG: 5 INJECTION, SOLUTION INTRAVENOUS at 20:45

## 2018-04-25 RX ADMIN — SERTRALINE HYDROCHLORIDE SCH: 100 TABLET ORAL at 20:33

## 2018-04-25 NOTE — XR
EXAMINATION TYPE: XR ankle limited LT

 

DATE OF EXAM: 4/25/2018

 

COMPARISON: Today

 

HISTORY: Post reduction

 

TECHNIQUE: 2 views

 

FINDINGS: There is persistent posterior dislocation of the talus. There is comminuted trimalleolar fr
acture of the ankle. Only the medial malleolus fragment appears to be reduced slightly. There is stil
l significant posterior dislocation of the talus.

 

IMPRESSION: Very little improvement compared to initial exam.

## 2018-04-25 NOTE — CT
History preop planning. Ankle fracture.

 

Comparison ankle x-ray yesterday.

 

TECHNIQUE:

Multiple axial sections were obtained from the proximal tibia to the mid metatarsals with no contrast
. There are reconstructed images.

 

Findings

 

There is been reduction of the posterior dislocation of the talus compared to yesterday. There is a l
arge 2.5 cm chip fracture of the posterior malleolus which is displaced 5 mm. There is a comminuted f
racture of the distal fibula which is obliquely oriented and separation of the fragments up to 8 mm. 
There is a 2 cm large chip fracture of the medial malleolus. This is in good anatomic position and se
paration of 3 to 4 mm. There are pins in the proximal tibia as well as through the posterior calcaneu
s.

 

CONCLUSION:

There is good anatomic reduction of the trimalleolar fracture dislocation of the ankle joint. There i
s external fixation hardware in the proximal tibia and the posterior calcaneus. No fracture seen of t
he visualized bones of the foot.

## 2018-04-25 NOTE — P.OP
Date of Procedure: 04/25/18


Preoperative Diagnosis: 


1. Closed left trimalleolar ankle fracture dislocation


2.  Current cigarette smoker


3.  Bipolar disorder


Postoperative Diagnosis: 


Same


Procedure(s) Performed: 


1.  Application of left ankle spanning external fixator as part of a staged 

procedure


2.  Closed reduction left ankle fracture dislocation


Anesthesia: CHRISTINA


Surgeon: Gianfranco Spencer


Assistant #1: José Luis Taylor


Estimated Blood Loss (ml): 10


IV fluids (ml): 700


Pathology: none sent


Condition: stable


Disposition: PACU


Indications for Procedure: 


The patient is a very pleasant 52-year-old female with a medical history 

significant for cigarette smoking who is admitted with a left ankle fracture 

dislocation.  The patient was at work yesterday and was hit by [employer] and 

tripped over a skid of palates.  She had an isolated injury to her left ankle.  

There is obvious pain, deformity, and an inability to ambulate.  She was 

brought to the emergency department where 3 attempts at closed reduction and 

splinting were attempted by the emergency department physician.  The ankle was 

extremely unstable and was unable to be reduced.  She was placed in a splint 

and reduced.  I met with the patient preoperatively this morning to discuss 

closed reduction and application of an  external fixator in the operating room 

later today.  We discussed that this is part of a staged procedure and that she 

will still require definitive fixation.  We discussed the potential risks and 

complications of surgery including but not limited to risk of anesthesia, risk 

of superficial infection, risk of deep infection, risk of pin site infection, 

risk of intraoperative fracture, risk of postoperative fracture, risk of damage 

to local blood vessels or nerves, risk of DVT, risk of PE, and possibly loss of 

life or limb.  The patient voiced understanding of this and provided her verbal 

and written consent to go forward with surgery.


Description of Procedure: 


I met the patient in preoperative holding and discussed the procedure with her 

in detail.  The correct left leg was marked with my initials.  The patient was 

then brought back to the operating room.  She was positioned on the OR table 

where a general anesthetic and preoperative antibiotics were administered.  A 

timeout was then performed identifying the correct patient, operative extremity

, and procedure.  All bony prominences are well-padded.  The splint on the left 

ankle was removed.  There is diffuse swelling throughout the ankle and foot.  

There were serous filled fracture blisters diffusely over the lateral aspect of 

the ankle.  There was no skin at risk or areas of pressure necrosis.  There was 

no wrinkling of the skin diffusely throughout the ankle.  The left leg had a 

bump placed under the buttock internally rotating the leg to neutral.  A bone 

foam ramp was placed under the left leg elevating it.  The left leg was then 

prepped and draped in the standard sterile fashion.





I began by placing a centrally threaded calcaneal Steinmann pin through the 

calcaneus.  A small stab incision was made over the medial aspect of the 

posterior tuberosity of the calcaneus.  Dissection was carried down bluntly 

through the subcutaneous tissue with a hemostat.  A centrally threaded 7 mm 

Steinmann pin was placed by power from medial to lateral.  The lateral skin at 

a stab incision over the Steinmann pin as it broke to the skin.  A Steinmann 

pin was advanced until the threads were within the calcaneus.  I then placed 2 

partially threaded Steinmann pins in the tibia.  A stab incision was made over 

the proximal aspect of the tibia anteriorly.  Using a 3.5 mm drill bit a  

hole was made just medial to the crest of the tibia.  A terminally threaded 5 

mm Steinmann pin was placed by hand engaging both the anterior and posterior 

cortex of the tibia.  A clamp was then placed in the position of the second 

Steinmann pin was marked out.  Stab incision was made with a scalpel.  

Dissection was carried down bluntly through subcu tenderness tissue to 

hemostat.  A 3.5 mm drill bit was used to create a  hole.  A terminally 

threaded 5 mm Steinmann pin was placed by hand engaging both the anterior and 

posterior cortex of the tibia.  A clamp was placed on these 2 Steinmann pins.  

Pin-to-bar clamps were placed on the calcaneal pin.  Graphite rods were placed 

with the ankle held reduced in both the mortise and lateral views.  At this 

point Betadine soaked sponges were placed over the exposed Steinmann pins at 

the skin.  Final fluoroscopic images were saved documenting reduction of the 

ankle.  Sterile dressing was applied followed by an Ace wrap.  The patient was 

then awoken from her anesthetic, transferred to a gurney, and brought to PACU 

having tolerated the procedure well.





José Luis Taylor PA-C was required is a skilled assistant for patient positioning, 

reduction of fracture, placement of external fixator, and application of 

dressing.





Plan: The patient is going to be admitted overnight for pain control and pin 

site care education.  She'll receive 2 doses of antibiotics.  She will be 

placed on Lovenox while in-house.  She'll follow up in 1 week for clinical 

recheck to monitor soft tissue envelope.  She understands that she will still 

need definitive fixation.  She will also require a computed tomography scan of 

the ankle.

## 2018-04-25 NOTE — P.HPOR
History of Present Illness


H&P Date: 04/25/18


Chief Complaint: Left Trimalleolar ankle fracture


Patient is a 52-year-old female seen at bedside this morning who was admitted 

through the emergency room last evening 04/24/2018 after a fall at work 

resulted in a left ankle fracture.   She states that a [employer] was coming and as 

she went to step back, forgot that there was a pallet there,  twisted her ankle 

and fell backwards.  She continues to have left ankle pain as expected this 

morning.  An attempt last evening of a reduction of her fracture dislocation 

was performed but unsuccessful.  She is in a posterior short leg splint.  She 

is denying constant numbness or tingling.  She has no other injury complaints.  

She has no calf pain, fever, chills, chest pain or shortness of breath.  Review 

of systems is negative for nausea, vomiting, dizziness, headaches, slurred 

speech, sudden weight loss, difficulty swallowing, changes in bowel or bladder 

habits, back pain, abdominal pain,  headaches or visual changes, or any other 

complaints








Review of Systems


All systems: negative


Constitutional: Denies chills, Denies fever


Eyes: denies blurred vision, denies pain


Ears, nose, mouth and throat: Denies headache, Denies sore throat


Cardiovascular: Denies chest pain, Denies shortness of breath


Respiratory: Denies cough


Gastrointestinal: Denies abdominal pain, Denies diarrhea, Denies nausea, Denies 

vomiting


Genitourinary: Denies dysuria, Denies hematuria


Musculoskeletal: Denies myalgias


Integumentary: Denies pruritus, Denies rash


Neurological: Denies numbness, Denies weakness


Psychiatric: Denies anxiety, Denies depression


Endocrine: Denies fatigue, Denies weight change





Past Medical History


Past Medical History: Hypertension


History of Any Multi-Drug Resistant Organisms: None Reported


Additional Past Surgical History / Comment(s): umblical surgery repair.


Past Anesthesia/Blood Transfusion Reactions: No Reported Reaction


Past Psychological History: Bipolar


Smoking Status: Current some day smoker


Past Alcohol Use History: Occasional


Past Drug Use History: None Reported





Medications and Allergies


 Home Medications











 Medication  Instructions  Recorded  Confirmed  Type


 


Omeprazole [PriLOSEC] 20 mg PO AC-BID PRN 01/06/18 04/24/18 History


 


QUEtiapine FUMARATE [SEROquel] 200 mg PO HS 04/24/18 04/24/18 History


 


Sertraline [Zoloft] 150 mg PO DAILY 04/24/18 04/24/18 History


 


hydrOXYzine PAMOATE [Vistaril] 25 mg PO BID 04/24/18 04/24/18 History











 Allergies











Allergy/AdvReac Type Severity Reaction Status Date / Time


 


Penicillins AdvReac  Rash/Hives Verified 04/24/18 23:20














Physical Examination


Inspection of the left lower extremity shows short leg posterior splint in 

place.  She has adequate perfusion to all toes with less than 2 second 

capillary refill.  She has sensation to light touch in all digits.  Calf 

appears to be soft and nontender.  There are no open wounds or lacerations.  No 

bleeding.  She has positive motor in all toes.  She has painless range of 

motion of the knee.








Results


X-rays of the left ankle show a dislocated trimalleolar








- Labs


Labs: 


 H & H











  04/24/18 Range/Units





  22:53 


 


Hgb  14.0  (11.4-16.0)  gm/dL


 


Hct  41.1  (34.0-46.0)  %








 Coagulation











  04/24/18 Range/Units





  22:53 


 


INR  1.0  (<1.2)  











Result Diagrams: 


 04/24/18 22:53





 04/24/18 22:53





Assessment and Plan


(1) Ankle fracture


Narrative/Plan: 


Patient has been nothing by mouth after midnight.  Patient has been reviewed 

with Dr. Spencer.  Plan is to proceed with surgical intervention this 

afternoon including closed versus open reduction of left trimalleolar ankle 

fracture and application of external fixation device.  We will request consult 

from Dr. Pop for perioperative medical management she'll return to the floor 

postoperatively with routine postop orthopedic protocol.


Current Visit: Yes   Status: Acute   Priority: Medium   Code(s): S82.899A - OTH 

FRACTURE OF UNSP LOWER LEG, INIT FOR CLOS FX   SNOMED Code(s): 44482266


   


Time with Patient: Less than 30

## 2018-04-26 RX ADMIN — POTASSIUM CHLORIDE SCH: 14.9 INJECTION, SOLUTION INTRAVENOUS at 06:56

## 2018-04-26 RX ADMIN — SERTRALINE HYDROCHLORIDE SCH MG: 100 TABLET ORAL at 09:03

## 2018-04-26 RX ADMIN — HYDROCODONE BITARTRATE AND ACETAMINOPHEN PRN EACH: 10; 325 TABLET ORAL at 21:38

## 2018-04-26 RX ADMIN — ASPIRIN 325 MG ORAL TABLET SCH MG: 325 PILL ORAL at 22:27

## 2018-04-26 RX ADMIN — ASPIRIN 325 MG ORAL TABLET SCH MG: 325 PILL ORAL at 09:03

## 2018-04-26 RX ADMIN — MORPHINE SULFATE PRN MG: 5 INJECTION, SOLUTION INTRAVENOUS at 23:22

## 2018-04-26 RX ADMIN — POTASSIUM CHLORIDE SCH: 14.9 INJECTION, SOLUTION INTRAVENOUS at 12:02

## 2018-04-26 RX ADMIN — MORPHINE SULFATE PRN MG: 5 INJECTION, SOLUTION INTRAVENOUS at 00:10

## 2018-04-26 RX ADMIN — MORPHINE SULFATE PRN MG: 5 INJECTION, SOLUTION INTRAVENOUS at 11:36

## 2018-04-26 RX ADMIN — POTASSIUM CHLORIDE SCH: 14.9 INJECTION, SOLUTION INTRAVENOUS at 21:38

## 2018-04-26 RX ADMIN — MORPHINE SULFATE PRN MG: 5 INJECTION, SOLUTION INTRAVENOUS at 06:59

## 2018-04-26 RX ADMIN — HYDROCODONE BITARTRATE AND ACETAMINOPHEN PRN EACH: 10; 325 TABLET ORAL at 09:04

## 2018-04-26 RX ADMIN — MORPHINE SULFATE PRN MG: 5 INJECTION, SOLUTION INTRAVENOUS at 03:26

## 2018-04-26 RX ADMIN — HYDROCODONE BITARTRATE AND ACETAMINOPHEN PRN EACH: 10; 325 TABLET ORAL at 15:28

## 2018-04-26 RX ADMIN — MORPHINE SULFATE PRN MG: 5 INJECTION, SOLUTION INTRAVENOUS at 19:56

## 2018-04-26 RX ADMIN — MORPHINE SULFATE PRN MG: 5 INJECTION, SOLUTION INTRAVENOUS at 16:44

## 2018-04-26 NOTE — FL
Fluoroscopy

 

HISTORY: Fracture

 

24 seconds fluoroscopy time supplied to the referring clinician.  3 intraoperative C-arm images docum
ent the procedure. See dictated report from orthopedic surgery.

## 2018-04-26 NOTE — P.PN
Subjective


Progress Note Date: 04/26/18


Principal diagnosis: 


Left trimalleolar ankle fracture





Patient is a pleasant 52-year-old female seen at bedside this morning.  She is 

postop day #1 from application of external fixation device after closed 

reduction of left trimalleolar ankle fracture.  She has pain at the surgical 

site as expected.  She denies any new complaints.  She denies calf pain.  She 

denies numbness, tingling, fever, chills, chest pain or shortness of breath.








Objective





- Vital Signs


Vital signs: 


 Vital Signs











Temp  98.4 F   04/26/18 07:53


 


Pulse  70   04/26/18 07:53


 


Resp  16   04/26/18 07:53


 


BP  127/77   04/26/18 07:53


 


Pulse Ox  93 L  04/26/18 07:53








 Intake & Output











 04/25/18 04/26/18 04/26/18





 18:59 06:59 18:59


 


Intake Total 1150 2600 


 


Output Total 5  


 


Balance 1145 2600 


 


Intake:   


 


  IV 1050  


 


  Intake, IV Titration 100 1600 





  Amount   


 


    IV Fluid Continuation 1, 100  





    000 ml As IV .STK-MED ONE   





    Rx#:[id number]   


 


    Lactated Ringers 1,000 ml  1600 





    @ 100 mls/hr IV .Q10H   





    [facility] Rx#:[id number]   


 


  Oral  1000 


 


Output:   


 


  Estimated Blood Loss 5  


 


Other:   


 


  Voiding Method Bedpan Bedpan 


 


  # Voids 1 3 














- Exam


Inspection of the left lower extremity shows benign surgical wounds with 

external fixation device in place.  There is no active bleeding or drainage.  

The calf is soft and nontender.  She has 2+ dorsalis pedis pulse and less than 

2 second capillary refill distally.  Sensation light touch is intact 

throughout.  Active motor is intact in all digits.








- Constitutional


General appearance: Present: no acute distress





- Psychiatric


Psychiatric: Present: A&O x's 3, appropriate affect, intact judgment & insight





- Labs


CBC & Chem 7: 


 04/25/18 17:17





 04/24/18 22:53





Assessment and Plan


(1) Ankle fracture


Narrative/Plan: 


She will continue with routine postop orthopedic protocol including pain 

management, wound care, physical therapy, DVT prophylaxis and medical 

management.  I've requested consult for consideration of rehab placement.  

Expect discharge in the next 1-2 days.


Current Visit: Yes   Status: Acute   Priority: Medium   Code(s): S82.899A - OTH 

FRACTURE OF UNSP LOWER LEG, INIT FOR CLOS FX   SNOMED Code(s): 90483874


   


Time with Patient: Less than 30

## 2018-04-26 NOTE — XR
Limited left ankle

 

HISTORY: Post fixation

 

3 intraoperative C-arm images document the procedure

## 2018-04-27 RX ADMIN — OXYCODONE AND ACETAMINOPHEN PRN EACH: 5; 325 TABLET ORAL at 21:56

## 2018-04-27 RX ADMIN — MORPHINE SULFATE PRN MG: 5 INJECTION, SOLUTION INTRAVENOUS at 05:35

## 2018-04-27 RX ADMIN — SERTRALINE HYDROCHLORIDE SCH MG: 100 TABLET ORAL at 09:06

## 2018-04-27 RX ADMIN — OXYCODONE AND ACETAMINOPHEN PRN EACH: 5; 325 TABLET ORAL at 16:50

## 2018-04-27 RX ADMIN — MORPHINE SULFATE PRN MG: 5 INJECTION, SOLUTION INTRAVENOUS at 09:02

## 2018-04-27 RX ADMIN — OXYCODONE AND ACETAMINOPHEN PRN EACH: 5; 325 TABLET ORAL at 11:35

## 2018-04-27 RX ADMIN — HYDROCODONE BITARTRATE AND ACETAMINOPHEN PRN EACH: 10; 325 TABLET ORAL at 07:38

## 2018-04-27 RX ADMIN — ENOXAPARIN SODIUM SCH: 40 INJECTION SUBCUTANEOUS at 11:38

## 2018-04-27 RX ADMIN — ASPIRIN 325 MG ORAL TABLET SCH MG: 325 PILL ORAL at 21:40

## 2018-04-27 RX ADMIN — ASPIRIN 325 MG ORAL TABLET SCH MG: 325 PILL ORAL at 09:06

## 2018-04-27 NOTE — P.PN
Subjective


Progress Note Date: 04/27/18


Principal diagnosis: 


Left trimalleolar ankle fracture





Patient is a pleasant 52-year-old female seen at bedside this morning.  She is 

postop day #2 from application of external fixation device after closed 

reduction of left trimalleolar ankle fracture.  She has pain at the surgical 

site and fracture site as expected.  She denies any new complaints.  She denies 

calf pain.  She denies numbness, tingling, fever, chills, chest pain or 

shortness of breath.








Objective





- Vital Signs


Vital signs: 


 Vital Signs











Temp  98.6 F   04/27/18 07:45


 


Pulse  76   04/27/18 07:45


 


Resp  16   04/27/18 07:45


 


BP  117/74   04/27/18 07:45


 


Pulse Ox  98   04/27/18 07:45








 Intake & Output











 04/26/18 04/27/18 04/27/18





 18:59 06:59 18:59


 


Intake Total  60 


 


Balance  60 


 


Intake:   


 


  Intake, IV Titration  60 





  Amount   


 


    Lactated Ringers 1,000 ml  60 





    @ 100 mls/hr IV .Q10H   





    AYDEN Rx#:[id number]   


 


Other:   


 


  Voiding Method Bedpan  


 


  # Voids 2 1 














- Exam


Inspection of the left lower extremity shows benign surgical wounds with 

external fixation device in place.  There is no active bleeding or drainage.  

The calf is soft and nontender.  She has 2+ dorsalis pedis pulse and less than 

2 second capillary refill distally.  Sensation light touch is intact 

throughout.  Active motor is intact in all digits.








- Constitutional


General appearance: Present: no acute distress





- Psychiatric


Psychiatric: Present: A&O x's 3, appropriate affect, intact judgment & insight





- Labs


CBC & Chem 7: 


 04/25/18 17:17





 04/24/18 22:53





Assessment and Plan


(1) Ankle fracture


Narrative/Plan: 


She will continue with routine postop orthopedic protocol including pain 

management, wound care, physical therapy, DVT prophylaxis and medical 

management. We have increased her oral pain medication to percocet.  Expect 

transfer to rehab in the next 1-2 days.


Current Visit: Yes   Status: Acute   Priority: Medium   Code(s): S82.899A - OTH 

FRACTURE OF UNSP LOWER LEG, INIT FOR CLOS FX   SNOMED Code(s): 34985936


   


Time with Patient: Less than 30

## 2018-04-27 NOTE — P.PN
Subjective





Patient is seen and examined by me at bed side


She is postop day #2  of external fixation and closed reduction of her  left 

ankle fracture


Patient complains from pain in her surgery site, other than that she doesn't 

have any other complaints, no chest pain, no dyspnea, no fever


Patient blood pressure within the low normal level, it may be due to blood loss 

during the surgery and possible due to medication effect however patient is 

asymptomatic K point and blood pressure





Objective





- Vital Signs


Vital signs: 


 Vital Signs











Temp  98.6 F   04/27/18 07:45


 


Pulse  76   04/27/18 07:45


 


Resp  16   04/27/18 07:45


 


BP  117/74   04/27/18 07:45


 


Pulse Ox  98   04/27/18 07:45








 Intake & Output











 04/26/18 04/27/18 04/27/18





 18:59 06:59 18:59


 


Intake Total  60 


 


Balance  60 


 


Intake:   


 


  Intake, IV Titration  60 





  Amount   


 


    Lactated Ringers 1,000 ml  60 





    @ 100 mls/hr IV .Q10H   





    AYDEN Rx#:[id number]   


 


Other:   


 


  Voiding Method Bedpan  


 


  # Voids 2 1 














- Exam





Constitutional:       No acute distress, conversant, pleasant


Eyes:         Anicteric sclerae, moist conjunctiva, no lid-lag


         PERRLA


         ENMT:          NC/AT


         Oropharynx clear, no erythema, exudates


Neck:         Supple, FROM, no masses, or JVD


         No carotid bruits


         No thyromegaly


Lungs:           Clear to auscultation


         Clear to percussion


         Normal respiratory effort, no accessory muscle use 


Cardiovascular:      Heart regular in rate and rhythm, 


         No murmurs, gallops, or rubs


         No peripheral edema


Abdominal:       Soft


         Nontender, no guarding, rebound or rigidity


         Abdomen moving with respiration


         Normoactive bowel sounds


         No hepatomegaly, No splenomegaly


   No palpable mass 


         No abdominal wall hernia noted 


Skin:          Normal temperature, tone, texture, turgor


         No induration


No subcutaneous nodules 


         No rash, lesions


No ulcers


Extremities:      No digital cyanosis 


         No clubbing


         Pedal pulses intact and symmetrical


         Radial pulses intact and symmetrical 


         Normal gait and station


         No calf tenderness , left ankle is in a dressing and external fixators 

are in place, defer left ankle examination to the orthopedic team


Psychiatric:      Alert and oriented to person, place and time


         Appropriate affect


         Intact judgement         


Neuro:         Muscles Strength 5/5 in all 4 extremities


         Sensation to light touch grossly present throughout


         Cranial nerves II-XII grossly intact


         No focal sensory deficits








- Labs


CBC & Chem 7: 


 04/25/18 17:17





 04/24/18 22:53





Assessment and Plan


Plan: 





Patient is postop #24 close reduction and internal fixation of her left ankle 

fracture


Continue with pain management, DVT prophylaxis and wound care as per orthopedic 

team


Encourage oral hydration and monitor blood pressure


Patient will benefit from physical therapy rehab


Patient is a stable from our medical standpoint for discharge but recommended 

follow-up


Recommended the patient follow up with her PCP in one week after discharge





Thank you for allowing us to take care of this patient, please feel free to 

contact us for any question or concern

## 2018-04-28 RX ADMIN — ENOXAPARIN SODIUM SCH MG: 40 INJECTION SUBCUTANEOUS at 10:26

## 2018-04-28 RX ADMIN — ASPIRIN 325 MG ORAL TABLET SCH MG: 325 PILL ORAL at 10:25

## 2018-04-28 RX ADMIN — ASPIRIN 325 MG ORAL TABLET SCH MG: 325 PILL ORAL at 21:59

## 2018-04-28 RX ADMIN — OXYCODONE AND ACETAMINOPHEN PRN EACH: 5; 325 TABLET ORAL at 16:02

## 2018-04-28 RX ADMIN — OXYCODONE AND ACETAMINOPHEN PRN EACH: 5; 325 TABLET ORAL at 06:28

## 2018-04-28 RX ADMIN — OXYCODONE AND ACETAMINOPHEN PRN EACH: 5; 325 TABLET ORAL at 21:59

## 2018-04-28 RX ADMIN — SERTRALINE HYDROCHLORIDE SCH MG: 100 TABLET ORAL at 10:26

## 2018-04-28 RX ADMIN — CALCIUM CARBONATE (ANTACID) CHEW TAB 500 MG SCH MG: 500 CHEW TAB at 16:04

## 2018-04-28 RX ADMIN — CALCIUM CARBONATE (ANTACID) CHEW TAB 500 MG SCH MG: 500 CHEW TAB at 21:59

## 2018-04-28 NOTE — P.PN
Subjective


Progress Note Date: 04/28/18


Principal diagnosis: 


Left trimalleolar ankle fracture





Patient is a pleasant 52-year-old female seen at bedside this morning.  She is 

postop day #3 from application of external fixation device after closed 

reduction of left trimalleolar ankle fracture.  She has pain at the surgical 

site and fracture site as expected but is improved.  She denies any new 

complaints.  She denies calf pain.  She denies numbness, tingling, fever, chills

, chest pain or shortness of breath.








Objective





- Vital Signs


Vital signs: 


 Vital Signs











Temp  98.4 F   04/28/18 02:35


 


Pulse  67   04/28/18 02:35


 


Resp  16   04/28/18 02:35


 


BP  119/76   04/28/18 02:35


 


Pulse Ox  98   04/28/18 02:35








 Intake & Output











 04/27/18 04/28/18 04/28/18





 18:59 06:59 18:59


 


Intake Total  1080 


 


Balance  1080 


 


Intake:   


 


  Oral  1080 


 


Other:   


 


  Voiding Method  Toilet 


 


  # Voids 4 3 














- Exam


Inspection of the left lower extremity shows benign surgical wounds with 

external fixation device in place.  There is no active bleeding or drainage.  

The calf is soft and nontender.  She has 2+ dorsalis pedis pulse and less than 

2 second capillary refill distally.  Sensation light touch is intact 

throughout.  Active motor is intact in all digits.








- Constitutional


General appearance: Present: no acute distress





- Psychiatric


Psychiatric: Present: A&O x's 3, appropriate affect, intact judgment & insight





- Labs


CBC & Chem 7: 


 04/25/18 17:17





 04/24/18 22:53





Assessment and Plan


(1) Ankle fracture


Narrative/Plan: 


She will continue with routine postop orthopedic protocol including pain 

management, wound care, physical therapy, DVT prophylaxis and medical 

management. We have increased her oral pain medication to percocet.  Expect 

transfer to rehab in the next Monday.


Current Visit: Yes   Status: Acute   Priority: Medium   Code(s): S82.899A - OTH 

FRACTURE OF UNSP LOWER LEG, INIT FOR CLOS FX   SNOMED Code(s): 78317995


   


Time with Patient: Less than 30

## 2018-04-29 RX ADMIN — OXYCODONE AND ACETAMINOPHEN PRN EACH: 5; 325 TABLET ORAL at 20:57

## 2018-04-29 RX ADMIN — ASPIRIN 325 MG ORAL TABLET SCH MG: 325 PILL ORAL at 20:59

## 2018-04-29 RX ADMIN — CALCIUM CARBONATE (ANTACID) CHEW TAB 500 MG SCH MG: 500 CHEW TAB at 18:32

## 2018-04-29 RX ADMIN — CALCIUM CARBONATE (ANTACID) CHEW TAB 500 MG SCH MG: 500 CHEW TAB at 20:59

## 2018-04-29 RX ADMIN — OXYCODONE AND ACETAMINOPHEN PRN EACH: 5; 325 TABLET ORAL at 14:25

## 2018-04-29 RX ADMIN — Medication SCH UNIT: at 14:17

## 2018-04-29 RX ADMIN — SERTRALINE HYDROCHLORIDE SCH MG: 100 TABLET ORAL at 07:48

## 2018-04-29 RX ADMIN — ENOXAPARIN SODIUM SCH MG: 40 INJECTION SUBCUTANEOUS at 07:48

## 2018-04-29 RX ADMIN — OXYCODONE AND ACETAMINOPHEN PRN EACH: 5; 325 TABLET ORAL at 04:47

## 2018-04-29 RX ADMIN — CALCIUM CARBONATE (ANTACID) CHEW TAB 500 MG SCH MG: 500 CHEW TAB at 07:49

## 2018-04-29 RX ADMIN — ASPIRIN 325 MG ORAL TABLET SCH MG: 325 PILL ORAL at 07:48

## 2018-04-29 NOTE — P.PN
Subjective





Patient is seen and examined by me at bed side


She is postop day #2  of external fixation and closed reduction of her  left 

ankle fracture


Patient complains from pain in her surgery site, other than that she doesn't 

have any other complaints, no chest pain, no dyspnea, no fever


Patient blood pressure within the low normal level, it may be due to blood loss 

during the surgery and possible due to medication effect however patient is 

asymptomatic K point and blood pressure





Objective





- Vital Signs


Vital signs: 


 Vital Signs











Temp  97.9 F   04/29/18 15:43


 


Pulse  68   04/29/18 15:43


 


Resp  18   04/29/18 15:43


 


BP  116/72   04/29/18 15:43


 


Pulse Ox  100   04/29/18 15:43








 Intake & Output











 04/28/18 04/29/18 04/29/18





 18:59 06:59 18:59


 


Intake Total 980 240 417


 


Balance 980 240 417


 


Intake:   


 


  Oral 980 240 417


 


Other:   


 


  # Voids  2 














- Exam





Constitutional:       No acute distress, conversant, pleasant


Eyes:         Anicteric sclerae, moist conjunctiva, no lid-lag


         PERRLA


         ENMT:          NC/AT


         Oropharynx clear, no erythema, exudates


Neck:         Supple, FROM, no masses, or JVD


         No carotid bruits


         No thyromegaly


Lungs:           Clear to auscultation


         Clear to percussion


         Normal respiratory effort, no accessory muscle use 


Cardiovascular:      Heart regular in rate and rhythm, 


         No murmurs, gallops, or rubs


         No peripheral edema


Abdominal:       Soft


         Nontender, no guarding, rebound or rigidity


         Abdomen moving with respiration


         Normoactive bowel sounds


         No hepatomegaly, No splenomegaly


   No palpable mass 


         No abdominal wall hernia noted 


Skin:          Normal temperature, tone, texture, turgor


         No induration


No subcutaneous nodules 


         No rash, lesions


No ulcers


Extremities:      No digital cyanosis 


         No clubbing


         Pedal pulses intact and symmetrical


         Radial pulses intact and symmetrical 


         Normal gait and station


         No calf tenderness , left ankle is in a dressing and external fixators 

are in place, defer left ankle examination to the orthopedic team


Psychiatric:      Alert and oriented to person, place and time


         Appropriate affect


         Intact judgement         


Neuro:         Muscles Strength 5/5 in all 4 extremities


         Sensation to light touch grossly present throughout


         Cranial nerves II-XII grossly intact


         No focal sensory deficits








- Labs


CBC & Chem 7: 


 04/25/18 17:17





 04/24/18 22:53


Labs: 


 Abnormal Lab Results - Last 24 Hours (Table)











  04/28/18 Range/Units





  16:00 


 


Vitamin D 25-Hydroxy  14.8 L  (30.0-100.0)  ng/mL














Assessment and Plan


Assessment: 





-Left ankle fracture


-History of bipolar disease, not active issue now


- Smoker


Plan: 





Patient is postop for close reduction and internal fixation of her left ankle 

fracture


Continue with pain management, DVT prophylaxis and wound care as per orthopedic 

team


Encourage oral hydration and monitor blood pressure


Patient will benefit from physical therapy rehab


Patient is a stable from our medical standpoint for discharge but recommended 

follow-up


Recommended the patient follow up with her PCP in one week after discharge





Thank you for allowing us to take care of this patient, please feel free to 

contact us for any question or concern

## 2018-04-30 VITALS — DIASTOLIC BLOOD PRESSURE: 73 MMHG | TEMPERATURE: 99.4 F | HEART RATE: 59 BPM | SYSTOLIC BLOOD PRESSURE: 118 MMHG

## 2018-04-30 VITALS — RESPIRATION RATE: 16 BRPM

## 2018-04-30 RX ADMIN — ASPIRIN 325 MG ORAL TABLET SCH MG: 325 PILL ORAL at 08:41

## 2018-04-30 RX ADMIN — Medication SCH UNIT: at 08:42

## 2018-04-30 RX ADMIN — CALCIUM CARBONATE (ANTACID) CHEW TAB 500 MG SCH MG: 500 CHEW TAB at 08:42

## 2018-04-30 RX ADMIN — ENOXAPARIN SODIUM SCH: 40 INJECTION SUBCUTANEOUS at 08:49

## 2018-04-30 RX ADMIN — OXYCODONE AND ACETAMINOPHEN PRN EACH: 5; 325 TABLET ORAL at 08:48

## 2018-04-30 RX ADMIN — OXYCODONE AND ACETAMINOPHEN PRN EACH: 5; 325 TABLET ORAL at 14:30

## 2018-04-30 RX ADMIN — SERTRALINE HYDROCHLORIDE SCH MG: 100 TABLET ORAL at 08:42

## 2018-04-30 RX ADMIN — ENOXAPARIN SODIUM SCH MG: 40 INJECTION SUBCUTANEOUS at 08:42

## 2018-04-30 RX ADMIN — OXYCODONE AND ACETAMINOPHEN PRN EACH: 5; 325 TABLET ORAL at 03:17

## 2018-04-30 NOTE — P.DS
Providers


Date of admission: 


04/27/18 12:09





Expected date of discharge: 04/30/18


Attending physician: 


Gianfranco Spencer





Consults: 





 





04/25/18 09:42


Consult Physician Routine 


   Consulting Provider: Drew Betancur


   Consult Reason/Comments: periop medical management


   Do you want consulting provider notified?: Yes











Primary care physician: 


Donn PRESCOTT Charbal








- Discharge Diagnosis(es)


(1) Ankle fracture


Patient was admitted to the OR on 4/25/2018 to undergo closed reduction and 

application of external application device at the left ankle fracture. She had 

failed conservative measures as an outpatient and desired to proceed with 

elective surgery after given informed consent.  She underwent the above 

procedure which she tolerated well without complication.  Postoperative 

hospital course has remained without complication.  On day of discharge she is 

afebrile, vital signs stable, labs within acceptable ranges, tolerating by 

mouth meds and diet, voiding without difficulty, positive flatus, denies 

abdominal pain or calf pain, pain is controlled on oral pain medication and has 

no new complaints.  Wound is benign, neurovascular status is intact, calf is 

soft and nontender, abdomen soft and nontender.  Review of systems is negative 

for numbness, tingling, fever, chills, chest pain, shortness breath, nausea, 

vomiting, dizziness, headaches, slurred speech or other.


Current Visit: Yes   Status: Acute   Priority: Medium   


Procedures: 


Closed reduction, application of external fixation device





Patient Condition at Discharge: Good





Plan - Discharge Summary


Discharge Rx Participant: No


New Discharge Prescriptions: 


New


   oxyCODONE HCL/ACETAMINOPHEN [Percocet 5-325 mg] 1 tab PO Q4HR PRN #60 tab


     PRN Reason: Pain


   Aspirin 325 mg PO BID #60 tab


   Docusate [Colace] 100 mg PO BID #60 capsule





No Action


   Omeprazole [PriLOSEC] 20 mg PO AC-BID PRN


     PRN Reason: Heartburn


   Sertraline [Zoloft] 150 mg PO DAILY


   QUEtiapine FUMARATE [SEROquel] 200 mg PO HS


   hydrOXYzine PAMOATE [Vistaril] 25 mg PO BID


Discharge Medication List





Omeprazole [PriLOSEC] 20 mg PO AC-BID PRN 01/06/18 [History]


QUEtiapine FUMARATE [SEROquel] 200 mg PO HS 04/24/18 [History]


Sertraline [Zoloft] 150 mg PO DAILY 04/24/18 [History]


hydrOXYzine PAMOATE [Vistaril] 25 mg PO BID 04/24/18 [History]


Aspirin 325 mg PO BID #60 tab 04/30/18 [Rx]


Docusate [Colace] 100 mg PO BID #60 capsule 04/30/18 [Rx]


oxyCODONE HCL/ACETAMINOPHEN [Percocet 5-325 mg] 1 tab PO Q4HR PRN #60 tab 04/30/ 18 [Rx]








Follow up Appointment(s)/Referral(s): 


Sam Pop MD [Primary Care Provider] - 1-2 days


Gianfranco Spencer MD [Medical Doctor] - 05/03/18 3:00 pm


Activity/Diet/Wound Care/Special Instructions: 


BID pin site care with peroxide


non-weightbearing


elevate


take meds as directed


F/U with Johanna in office


Discharge Disposition: TRANSFER TO SNF/ECF

## 2018-04-30 NOTE — P.PN
Subjective





Patient is seen and examined by me at bed side


She is postop day #2  of external fixation and closed reduction of her  left 

ankle fracture


Patient complains from pain in her surgery site, other than that she doesn't 

have any other complaints, no chest pain, no dyspnea, no fever


Patient blood pressure within the low normal level, it may be due to blood loss 

during the surgery and possible due to medication effect however patient is 

asymptomatic K point and blood pressure





Objective





- Vital Signs


Vital signs: 


 Vital Signs











Temp  98 F   04/30/18 07:09


 


Pulse  62   04/30/18 07:09


 


Resp  16   04/30/18 07:09


 


BP  107/73   04/30/18 07:09


 


Pulse Ox  98   04/30/18 07:09








 Intake & Output











 04/29/18 04/30/18 04/30/18





 18:59 06:59 18:59


 


Intake Total 1417 240 


 


Balance 1417 240 


 


Intake:   


 


  Oral 1417 240 


 


Other:   


 


  # Voids  3 














- Exam





Constitutional:       No acute distress, conversant, pleasant


Eyes:         Anicteric sclerae, moist conjunctiva, no lid-lag


         PERRLA


         ENMT:          NC/AT


         Oropharynx clear, no erythema, exudates


Neck:         Supple, FROM, no masses, or JVD


         No carotid bruits


         No thyromegaly


Lungs:           Clear to auscultation


         Clear to percussion


         Normal respiratory effort, no accessory muscle use 


Cardiovascular:      Heart regular in rate and rhythm, 


         No murmurs, gallops, or rubs


         No peripheral edema


Abdominal:       Soft


         Nontender, no guarding, rebound or rigidity


         Abdomen moving with respiration


         Normoactive bowel sounds


         No hepatomegaly, No splenomegaly


   No palpable mass 


         No abdominal wall hernia noted 


Skin:          Normal temperature, tone, texture, turgor


         No induration


No subcutaneous nodules 


         No rash, lesions


No ulcers


Extremities:      No digital cyanosis 


         No clubbing


         Pedal pulses intact and symmetrical


         Radial pulses intact and symmetrical 


         Normal gait and station


         No calf tenderness , left ankle is in a dressing and external fixators 

are in place, defer left ankle examination to the orthopedic team


Psychiatric:      Alert and oriented to person, place and time


         Appropriate affect


         Intact judgement         


Neuro:         Muscles Strength 5/5 in all 4 extremities


         Sensation to light touch grossly present throughout


         Cranial nerves II-XII grossly intact


         No focal sensory deficits








- Labs


CBC & Chem 7: 


 04/25/18 17:17





 04/24/18 22:53





Assessment and Plan


Assessment: 





-Left ankle fracture


-History of bipolar disease, not active issue now


- Smoker


-low vitD 


Plan: 





Patient is postop for close reduction and internal fixation of her left ankle 

fracture


Continue with pain management, DVT prophylaxis and wound care as per orthopedic 

team


Encourage oral hydration and monitor blood pressure


Patient will benefit from physical therapy rehab


we recommend to add vit D and ca for pt medication as vit D is 14 (low) , D/W 

staff 





Patient is a stable from our medical standpoint for discharge but recommended 

follow-up


Recommended the patient follow up with her PCP in one week after discharge





Thank you for allowing us to take care of this patient, please feel free to 

contact us for any question or concern

## 2018-05-01 NOTE — CDI
Last Revision, December 2017



Documentation Clarification Form



Date: 5/1/18

From: Radha Clint

Phone:[phone] Imelda Knapp,  between 8:30 am & 5 pm -FMRN
: [id number]

Admit Date: 4/27/2018 12:09:00 PM

Patient Name: Annette Simpson

Visit Number: [id number]

Discharge Date: 4/30/18



ATTENTION: The Clinical Documentation Specialists (CDI) and [facility] Coding Staff 
appreciate your assistance in clarifying documentation. Please respond to the 
clarification below the line at the bottom and electronically sign. The CDI & 
[facility] Coding staff will review the response and follow-up if needed. Please note: 
Queries are made part of the Legal Health Record. If you have any questions, 
please contact the author of this message via ITS.



Dr. Gianfranco Spencer



Patient presented to ED due to ankle fracture, placed in observation on 4/25 
following surgery, then converted to Inpatient on 4/27. 



The patients principal diagnosis for the inpatient encounter has not been 
clearly identified and requires clarification.



In your professional opinion, can you please clarify which diagnosis, after 
study, accounted for the patients presenting symptoms and was the reason 
chiefly responsible for the inpatient admission?



Please continue to document in your progress notes and discharge summary in 
order to capture severity of illness and risk of mortality. Include clinical 
findings that support your diagnosis.

_______________________________________________
MTDD

## 2018-05-08 NOTE — CDI
Last Revision, December 2017



Documentation Clarification Form



Date: 5/8/18

From: Radha Clint

Phone: [phone] Imelda Knapp,  Hours-8:30 am & 5 pm M-F

MRN: [id number]

Admit Date: 4/27/2018 12:09:00 PM

Patient Name: Annette Simpson

Visit Number: [id number]

Discharge Date: 4/30/18



ATTENTION: The Clinical Documentation Specialists (CDI) and [facility] Coding Staff 
appreciate your assistance in clarifying documentation. Please respond to the 
clarification below the line at the bottom and electronically sign. The CDI & 
[facility] Coding staff will review the response and follow-up if needed. Please note: 
Queries are made part of the Legal Health Record. If you have any questions, 
please contact the author of this message via ITS.



Dr. Gianfranco Spnecer



Patient presented to ED due to ankle fracture, placed in observation on 4/25 
following surgery, then converted to Inpatient on 4/27. 



The patients principal diagnosis for the inpatient encounter has not been 
clearly identified and requires clarification.



In your professional opinion, can you please clarify the diagnosis, after study
, accounted for the patients presenting symptoms and was the reason chiefly 
responsible for the inpatient admission?



Please continue to document in your progress notes and discharge summary in 
order to capture severity of illness and risk of mortality. Include clinical 
findings that support your diagnosis.

__________________________________________________
MTDD

## 2018-05-15 NOTE — CDI
Last Revision, December 2017



Documentation Clarification Form



Date: 5/15/2018 

From: Radha Clint

Phone:[phone] Imelda Knapp,  Hours-8:30 am & 5 pm M-F

MRN: [id number]

Admit Date: 4/27/2018 12:09:00 PM

Patient Name: Annette Simpson

Visit Number: [id number]

Discharge Date: 4/30/18



ATTENTION: The Clinical Documentation Specialists (CDI) and [facility] Coding Staff 
appreciate your assistance in clarifying documentation. Please respond to the 
clarification below the line at the bottom and electronically sign. The CDI & 
[facility] Coding staff will review the response and follow-up if needed. Please note: 
Queries are made part of the Legal Health Record. If you have any questions, 
please contact the author of this message via ITS.



Dr. Gianfranco Spencer



Patient presented to ED due to ankle fracture, placed in observation on 4/25 
following surgery, then converted to Inpatient on 4/27. 



The patients principal diagnosis for the inpatient encounter has not been 
clearly identified and requires clarification.



In your professional opinion, can you please clarify which diagnosis, after 
study, accounted for the patients presenting symptoms and was the reason 
chiefly responsible for the inpatient admission?



Please continue to document in your progress notes and discharge summary in 
order to capture severity of illness and risk of mortality. Include clinical 
findings that support your diagnosis.

___________________________________
MTDD

## 2018-05-18 ENCOUNTER — HOSPITAL ENCOUNTER (INPATIENT)
Dept: HOSPITAL 47 - 2ORMAIN | Age: 53
LOS: 3 days | Discharge: NURSING, REHAB OR LONG-TERM CARE | DRG: 493 | End: 2018-05-21
Payer: COMMERCIAL

## 2018-05-18 VITALS — BODY MASS INDEX: 25 KG/M2

## 2018-05-18 DIAGNOSIS — D62: ICD-10-CM

## 2018-05-18 DIAGNOSIS — K21.9: ICD-10-CM
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
## 2018-05-18 DIAGNOSIS — Z88.0: ICD-10-CM

## 2018-05-18 DIAGNOSIS — Z79.899: ICD-10-CM

## 2018-05-18 DIAGNOSIS — F31.9: ICD-10-CM

## 2018-05-18 DIAGNOSIS — I10: ICD-10-CM

## 2018-05-18 DIAGNOSIS — S82.852A: Primary | ICD-10-CM

## 2018-05-18 DIAGNOSIS — E66.01: ICD-10-CM
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
## 2018-05-18 DIAGNOSIS — F17.210: ICD-10-CM

## 2018-05-18 PROCEDURE — 80048 BASIC METABOLIC PNL TOTAL CA: CPT

## 2018-05-18 PROCEDURE — 84703 CHORIONIC GONADOTROPIN ASSAY: CPT

## 2018-05-18 PROCEDURE — 82306 VITAMIN D 25 HYDROXY: CPT

## 2018-05-18 PROCEDURE — 0QSH04Z REPOSITION LEFT TIBIA WITH INTERNAL FIXATION DEVICE, OPEN APPROACH: ICD-10-PCS

## 2018-05-18 PROCEDURE — 0QSK04Z REPOSITION LEFT FIBULA WITH INTERNAL FIXATION DEVICE, OPEN APPROACH: ICD-10-PCS

## 2018-05-18 PROCEDURE — 81025 URINE PREGNANCY TEST: CPT

## 2018-05-18 PROCEDURE — 85025 COMPLETE CBC W/AUTO DIFF WBC: CPT

## 2018-05-18 PROCEDURE — 85027 COMPLETE CBC AUTOMATED: CPT

## 2018-05-18 PROCEDURE — 94760 N-INVAS EAR/PLS OXIMETRY 1: CPT

## 2018-05-18 RX ADMIN — POTASSIUM CHLORIDE SCH MLS/HR: 14.9 INJECTION, SOLUTION INTRAVENOUS at 19:40

## 2018-05-18 RX ADMIN — OXYCODONE AND ACETAMINOPHEN PRN EACH: 5; 325 TABLET ORAL at 19:38

## 2018-05-18 RX ADMIN — CEFAZOLIN SCH GM: 10 INJECTION, POWDER, FOR SOLUTION INTRAVENOUS at 23:00

## 2018-05-18 NOTE — P.OP
Date of Procedure: 05/18/18


Preoperative Diagnosis: 


1.  Closed left trimalleolar ankle fracture dislocation


2.  Current every day cigarette smoker


Postoperative Diagnosis: 


Same


Procedure(s) Performed: 


1.  Open reduction and internal fixation of left trimalleolar ankle fracture


2.  Removal of left ankle spanning external fixator


3.  Application of joint stress for radiography by physician, left ankle


4.  Application of short leg splint by physician, left ankle


Anesthesia: CHRISTINA


Surgeon: Gianfranco Spencer


Assistant #1: Renetta Cooley


Estimated Blood Loss (ml): 200


IV fluids (ml): 1,300


Pathology: none sent


Condition: stable


Disposition: PACU


Indications for Procedure: 


The patient is a 52-year-old female with a medical history significant for 

being a current every day cigarette smoker.  The patient sustained a work-

related injury resulting in a irreducible left ankle fracture dislocation over 

3 weeks ago.  She was admitted to the hospital and had an ankle spanning 

external fixator placed to hold her ankle reduced and allow her soft tissue 

swelling to resolve.  The patient has been seen in my office weekly for the 

last 3 weeks to monitor her soft tissue envelope.  Earlier this week she had 

wrinkling of the skin and her fracture blisters had resolved.  Her soft tissue 

was thought to be ready for surgery.  We discussed the risks and potential 

complications of surgery including but not limited to risk of anesthesia, risk 

of superficial infection, risk of deep infection, risk of delayed wound healing

, risk of superficial wound necrosis, risk of deep wound necrosis, risk of 

fracture nonunion, risk of fracture malunion, risk of symptomatically hardware, 

risk of chronic pain, risk of chronic swelling, risk of posttraumatic arthritis

, risk of postoperative displacement of her ankle mortise, risk of needing 

further surgery, risk of generalized to satisfaction with surgery, risk of 

inability to regain preinjury level of function, risk of needing major 

reconstructive surgery such as an ankle fusion, risk of DVT, risk of PE, and 

possibly loss of life or limb.  The patient voiced her understanding of this.  

She also acknowledges that she is at a much higher risk of having a 

complication particularly delayed wound healing or fracture nonunion due to her 

cigarette smoking.  She provided her verbal and written consent to go forward 

with the above procedures.


Description of Procedure: 


The patient was identified in preoperative holding and the correct left leg was 

marked with my initials.  I reviewed the consent form with the patient and all 

of her questions were answered.  A popliteal and saphenous nerve block was 

placed by anesthesia area the patient was then brought back to the operating 

room and positioned on the OR table.  A general anesthetic and preoperative 

antibiotics were administered.  Once the patient was under anesthesia a timeout 

was performed identifying the correct patient, operative extremity, and 

procedure.  I began removing the external fixator.  The clamps and bars were 

removed followed by the tibial pins.  The medial aspect of the trans-calcaneal 

pin was cut at the level of the skin.  Betadine was used to cleanse the skin 

and remaining pin site medially.  A universal T-handled carley was used to 

remove the pin laterally.  After removing the ex-fix the patient was then 

placed in the lateral decubitus position with the right side down and the left 

leg up.  The patient was secured in the lateral decubitus position with a 

beanbag.  An axillary roll was placed.  The right leg was scissored anteriorly 

and a bone foam ramp was placed under the left leg elevating it to facilitate 

imaging.  All bony prominences were well-padded.  The left leg was then prepped 

and draped in the standard sterile fashion.  The leg was then elevated, 

exsanguinated with an Esmarch bandage, and the tourniquet was inflated to 250 

mmHg.





I began by outlining an incision for a posterolateral approach to the distal 

fibula and posterior malleolus.  The fibula and Achilles tendon were marked 

out.  Distally the incision was centered between the fibula and Achilles tendon

, but proximally the incision had to be angled toward the Achilles tendon due 

to the resolving fracture blisters.  Skin incision was made with a 15 blade 

scalpel.  Dissection was carried down carefully through subcutaneous tissue 

with tenotomy scissors.  The sural nerve and vein were identified and carefully 

retracted.  Care was taken to develop full-thickness flaps down to the level of 

the fascia over the peroneal muscles.  The fascia was incised longitudinally in 

line with the skin incision.  I developed the interval between the peroneal 

muscles on the FHL.  The posterior aspect of the tibia was then identified.  

The apex of the fracture was identified and periosteum and early callus was 

debrided.  The fracture site was gently opened up with a small lamina .

  I initially tried to gain length of the posterior malleolus fracture by 

dorsiflexing the ankle, but the posterior malleolus fracture remained well 

reduced.  I then made a small unicortical drill hole in the posterior malleolus 

fragment and attempted to manipulate the posterior malleolus with a large Rogers 

reduction clamp.  A small stab incision was made anteriorly and I tried to 

lever the posterior malleolus distally.  I was unable to gain length were 

anatomically reduced the posterior malleolus.  I then placed a contoured 

posterior malleolus or plate over the posterior tibia.  Unicortical screws were 

placed distally into the posterior malleolus fragment up to the fracture line.  

A push screw was then placed just proximal to the plate.  A lamina  was 

used with teeth against the push screw and superior aspect of the plate.  The 

lamina  was gently opened and visualized under fluoroscopy until the 

posterior malleolus was reduced.  It was then clamp.  3.5 screws were placed in 

the first and third hole of the plate proximal to the fracture.  The 

unicortical screws were removed distally and replaced with 35 lag screws.  

Fluoroscopy showed the posterior malleolus out to length and the plafond and 

anatomically reduced.





Attention was then turned to the distal fibula.  Dissection was carried 

anteriorly to the peroneal tendons down to the posterior border of the fibula.  

There was comminution at the apex of the fracture.  The patient's bone quality 

was markedly diminished and it was very difficult to clamp her fracture without 

breaking apart the anterior and posterior cortex of the fragments.  The fibula 

was gently pulled out to length and pinned in place.  A point-to-point 

reduction clamp was placed out of the way of where the posterior anti-glide 

plate would be placed in the K wire was removed.  An 8 hole one third tubular 

plate was placed along the posterior lateral border of the fibula.  A 3.5 screw 

was placed in the fourth hole of the plate just proximal to the fracture.  3.5 

mm screws were then placed in the first and third holes of the plate.  A 

posterior to anterior 3.5 mm screw was placed in the most distal hole of the 

plate.  A 2.7 mm lag screw was placed in the seventh hole of the plate.  A 2.7 

mm drill bit was used to create a gliding hole in the posterior cortex the 

distal fragment and a 2.0 mm drill bit was used to create a threaded hole in 

the anterior cortex of the proximal shaft.  A fully threaded 2.7 mm screw was 

then placed.  On inspection the fibula fracture appeared to be reduced and 

compressed.





Attention was then turned to the medial malleolus.  The air of the beanbag was 

let out and the patient was carefully positioned in the supine position.  A 

longitudinal incision was made centered over the medial malleolus.  Skin 

incision was made with a 15 blade scalpel.  Dissection was carried down 

carefully to the subcutaneous tissue with tenotomy scissors.  The medial 

malleolus fracture was identified and early callus was removed with the 

scalpel.  Dissection was carried anteriorly to the level of the ankle joint to 

allow visualization of the reduction.  A 2.0 mm drill bit was used to create a 

unicortical perforation in the distal tibial metaphysis proximal to the 

fracture.  1 zeny of a point-to-point reduction clamp was placed in this hole 

and the second zeny was placed at the tip of the medial malleolus.  The clamp 

was gently tightened.  Visually the medial malleolus fracture appeared to be 

anatomically reduced.  Fluoroscopy was brought in to verify reduction of the 

medial malleolus.  A 2.5 mm drill bit was used to create a path for a fully 

threaded 3.5 diameter 55 mm length solid screw.  Due to the size of the 

fragment I was only able to place a single screw.





At this point C-arm fluoroscopy was brought in to take final fluoroscopic 

images.  A mortise view showed the talus to be anatomically reduced in the 

ankle mortise.  The fibula appeared to be out to length with no widening at the 

incisor a or medial clear space.  The lateral view showed an anatomic reduction 

of the tibial plafond and the talus centered under the plafond.  A manual 

external rotation stress image was taken.  One external rotation stress was 

applied across the ankle there was no widening of the medial clear space or 

incisor a.  I interpreted this as a stable syndesmosis and ankle.  Both wounds 

were copiously irrigated.  The tourniquet was let down with a total tourniquet 

time of 2 hours.  The deep fascial layer was closed laterally with a running 0 

Vicryl stitch.  The subcu was reapproximated using 2-0 Vicryl.  The skin was 

closed with 3-0 nylon horizontal mattress stitches.  The stab incision over the 

anterolateral aspect of the ankle was closed with interrupted 3-0 nylon.  The 

incision medially was closed with 0 Vicryl for the deep fascial layer, 2-0 

Vicryl for the subcutaneous layer, and 3-0 nylon in the skin.  I verified that 

all instrument, sponge, and sharp counts were correct.  A sterile dressing 

consisting of Betadine soaked Adaptic, 4 x 4, and web roll was applied.  The 

drapes were taken down and a well-padded bulky Cabrera splint with the ankle in 

neutral was placed.  The patient was then awoken from her anesthetic, 

transferred to a gurney and brought to PACU having towels the procedure well.





Renetta Cooley NP was required as a skilled assistant for patient positioning, 

surgical approach, retraction, reduction of the fracture, placement of hardware

, closure of wounds, and application of splint.





Plan: The patient is going to be admitted for pain control, IV antibiotics, and 

DVT prophylaxis.  She is to remain strictly nonweightbearing on her left leg in 

a splint.  The patient can discharge home when her pain is controlled she 

passes physical therapy.  The patient will be nonweightbearing for at least 6 

weeks.  We discussed return to work without restriction will likely take 4-6 

months.  The patient was also strongly encouraged to quit smoking to help with 

the healing of her soft tissue and fractures.  She understands the higher risk 

of complications due to her cigarette smoking and we counseled her at length to 

completely quit to help facilitate healing.

## 2018-05-18 NOTE — P.ONQ
Anesthesiology Proc Note - PNB





- Peripheral Nerve Block Performed


  ** Left Popliteal


Indication: Acute Post-Operative Pain, Requested by physician (Dr Tucker)


Sedation Type: Sedate with meaningful contact maintained


Preparation: Sterile Prep


Position: Supine (Lateral)


Needle Types: Other (see comment) (Neo)


Needle Size: 50mm (2")


Needle Gauge: 21


Technique: Ultrasound


Injectate: 0.5% Ropivacaine (see comment for volume) (20cc)

## 2018-05-18 NOTE — XR
EXAMINATION TYPE: 

 

XR ankle complete 2 views LT, 

FL guidance operating room

 

DATE OF EXAM: 5/18/2018

 

FLUOROSCOPY

 

Fluoroscopy time of 1 minute 37 seconds was used during left ankle ORIF.  3 image/s document/s the pr
ocedure.

 

Images show lateral malleolar plate and screw fixation, posterior malleolus and screw fixation as wel
l as single screw fixation along the medial malleolus.

 

 

 

IMPRESSION:

Intraoperative fluoroscopy as above.

## 2018-05-19 LAB
ERYTHROCYTE [DISTWIDTH] IN BLOOD BY AUTOMATED COUNT: 2.72 M/UL (ref 3.8–5.4)
ERYTHROCYTE [DISTWIDTH] IN BLOOD: 14 % (ref 11.5–15.5)
HCT VFR BLD AUTO: 24.7 % (ref 34–46)
HGB BLD-MCNC: 8.1 GM/DL (ref 11.4–16)
MCH RBC QN AUTO: 29.9 PG (ref 25–35)
MCHC RBC AUTO-ENTMCNC: 32.9 G/DL (ref 31–37)
MCV RBC AUTO: 90.9 FL (ref 80–100)
PLATELET # BLD AUTO: 286 K/UL (ref 150–450)
WBC # BLD AUTO: 9.7 K/UL (ref 3.8–10.6)

## 2018-05-19 RX ADMIN — OXYCODONE HYDROCHLORIDE SCH MG: 10 TABLET, FILM COATED, EXTENDED RELEASE ORAL at 10:55

## 2018-05-19 RX ADMIN — POTASSIUM CHLORIDE SCH: 14.9 INJECTION, SOLUTION INTRAVENOUS at 06:50

## 2018-05-19 RX ADMIN — OXYCODONE HYDROCHLORIDE SCH MG: 10 TABLET, FILM COATED, EXTENDED RELEASE ORAL at 10:53

## 2018-05-19 RX ADMIN — OXYCODONE AND ACETAMINOPHEN PRN EACH: 5; 325 TABLET ORAL at 01:50

## 2018-05-19 RX ADMIN — CALCIUM CARBONATE (ANTACID) CHEW TAB 500 MG SCH MG: 500 CHEW TAB at 09:20

## 2018-05-19 RX ADMIN — CEFAZOLIN SCH GM: 10 INJECTION, POWDER, FOR SOLUTION INTRAVENOUS at 05:38

## 2018-05-19 RX ADMIN — CALCIUM CARBONATE (ANTACID) CHEW TAB 500 MG SCH MG: 500 CHEW TAB at 15:58

## 2018-05-19 RX ADMIN — POTASSIUM CHLORIDE SCH: 14.9 INJECTION, SOLUTION INTRAVENOUS at 06:51

## 2018-05-19 RX ADMIN — PANTOPRAZOLE SODIUM SCH MG: 40 TABLET, DELAYED RELEASE ORAL at 16:02

## 2018-05-19 RX ADMIN — Medication SCH UNIT: at 09:20

## 2018-05-19 RX ADMIN — OXYCODONE HYDROCHLORIDE SCH MG: 10 TABLET, FILM COATED, EXTENDED RELEASE ORAL at 20:02

## 2018-05-19 RX ADMIN — CALCIUM CARBONATE (ANTACID) CHEW TAB 500 MG SCH MG: 500 CHEW TAB at 22:28

## 2018-05-19 RX ADMIN — OXYCODONE AND ACETAMINOPHEN PRN EACH: 5; 325 TABLET ORAL at 05:37

## 2018-05-19 RX ADMIN — OXYCODONE AND ACETAMINOPHEN PRN EACH: 5; 325 TABLET ORAL at 19:01

## 2018-05-19 RX ADMIN — HYDROMORPHONE HYDROCHLORIDE PRN MG: 1 INJECTION, SOLUTION INTRAMUSCULAR; INTRAVENOUS; SUBCUTANEOUS at 15:28

## 2018-05-19 RX ADMIN — OXYCODONE AND ACETAMINOPHEN PRN EACH: 5; 325 TABLET ORAL at 10:00

## 2018-05-19 RX ADMIN — OXYCODONE AND ACETAMINOPHEN PRN EACH: 5; 325 TABLET ORAL at 14:11

## 2018-05-19 RX ADMIN — HYDROMORPHONE HYDROCHLORIDE PRN MG: 1 INJECTION, SOLUTION INTRAMUSCULAR; INTRAVENOUS; SUBCUTANEOUS at 22:28

## 2018-05-19 RX ADMIN — ENOXAPARIN SODIUM SCH MG: 40 INJECTION SUBCUTANEOUS at 08:20

## 2018-05-19 NOTE — P.PN
Subjective


Progress Note Date: 05/19/18


Principal diagnosis: 


Status post ORIF left trimalleolar ankle fracture





Patient is seen at bedside this morning.  She is postop day #1 from ORIF of 

left trimalleolar ankle fracture.  She has pain at the surgical site as 

expected but denies any new complaints.  She denies numbness, tingling or calf 

pain.  Review of systems is negative for fever, chills, chest pain, shortness 

of breath or other








Objective





- Vital Signs


Vital signs: 


 Vital Signs











Temp  98.6 F   05/19/18 06:59


 


Pulse  69   05/19/18 06:59


 


Resp  16   05/19/18 06:59


 


BP  98/60   05/19/18 06:59


 


Pulse Ox  92 L  05/19/18 07:28








 Intake & Output











 05/18/18 05/19/18 05/19/18





 18:59 06:59 18:59


 


Intake Total 2000 3000 


 


Output Total 200  


 


Balance 1800 3000 


 


Intake:   


 


  IV 2000  


 


  Intake, IV Titration  700 





  Amount   


 


    Lactated Ringers 1,000 ml  700 





    @ 100 mls/hr IV .Q10H   





    [facility] Rx#:[id number]   


 


  Oral  2300 


 


Output:   


 


  Estimated Blood Loss 200  


 


Other:   


 


  Voiding Method  Bedside Commode Bedside Commode


 


  # Voids  3 1














- Exam


Inspection of the left lower extremity shows a well padded bulky splint in 

place.  There is some saturation through the dressing as expected.  There is 

less than 2 second capillary refill in all toes.  She is able to wiggle all 

toes.  She has sensation to light touch in all toes.  Lower extremity proximal 

to the splint is benign.








- Constitutional


General appearance: Present: no acute distress





- Psychiatric


Psychiatric: Present: A&O x's 3, appropriate affect, intact judgment & insight





- Labs


CBC & Chem 7: 


 05/19/18 08:23





Labs: 


 Abnormal Lab Results - Last 24 Hours (Table)











  05/19/18 Range/Units





  08:23 


 


RBC  2.72 L  (3.80-5.40)  m/uL


 


Hgb  8.1 L D  (11.4-16.0)  gm/dL


 


Hct  24.7 L  (34.0-46.0)  %














Assessment and Plan


(1) Ankle fracture


Narrative/Plan: 


She'll continue with routine postop orthopedic protocol including pain 

management, wound care, physical therapy, DVT prophylaxis and medical 

management.  She'll continue with elevation of the left lower extremity.  Her 

dressing/splint will be reinforced.  Expect transfer to rehab on Monday.


Current Visit: No   Status: Acute   Priority: Medium   Code(s): S82.899A - OTH 

FRACTURE OF UNSP LOWER LEG, INIT FOR CLOS FX   SNOMED Code(s): 37294978


   


Time with Patient: Less than 30

## 2018-05-20 LAB
BASOPHILS # BLD AUTO: 0 K/UL (ref 0–0.2)
BASOPHILS NFR BLD AUTO: 0 %
EOSINOPHIL # BLD AUTO: 0.1 K/UL (ref 0–0.7)
EOSINOPHIL NFR BLD AUTO: 2 %
ERYTHROCYTE [DISTWIDTH] IN BLOOD BY AUTOMATED COUNT: 2.41 M/UL (ref 3.8–5.4)
ERYTHROCYTE [DISTWIDTH] IN BLOOD: 13.9 % (ref 11.5–15.5)
HCT VFR BLD AUTO: 21.9 % (ref 34–46)
HGB BLD-MCNC: 7.2 GM/DL (ref 11.4–16)
LYMPHOCYTES # SPEC AUTO: 1.7 K/UL (ref 1–4.8)
LYMPHOCYTES NFR SPEC AUTO: 35 %
MCH RBC QN AUTO: 29.9 PG (ref 25–35)
MCHC RBC AUTO-ENTMCNC: 33 G/DL (ref 31–37)
MCV RBC AUTO: 90.8 FL (ref 80–100)
MONOCYTES # BLD AUTO: 0.4 K/UL (ref 0–1)
MONOCYTES NFR BLD AUTO: 8 %
NEUTROPHILS # BLD AUTO: 2.5 K/UL (ref 1.3–7.7)
NEUTROPHILS NFR BLD AUTO: 53 %
PLATELET # BLD AUTO: 237 K/UL (ref 150–450)
WBC # BLD AUTO: 4.7 K/UL (ref 3.8–10.6)

## 2018-05-20 RX ADMIN — PANTOPRAZOLE SODIUM SCH MG: 40 TABLET, DELAYED RELEASE ORAL at 16:53

## 2018-05-20 RX ADMIN — HYDROMORPHONE HYDROCHLORIDE PRN MG: 1 INJECTION, SOLUTION INTRAMUSCULAR; INTRAVENOUS; SUBCUTANEOUS at 04:30

## 2018-05-20 RX ADMIN — ENOXAPARIN SODIUM SCH MG: 40 INJECTION SUBCUTANEOUS at 08:41

## 2018-05-20 RX ADMIN — Medication SCH UNIT: at 12:26

## 2018-05-20 RX ADMIN — CALCIUM CARBONATE (ANTACID) CHEW TAB 500 MG SCH MG: 500 CHEW TAB at 16:53

## 2018-05-20 RX ADMIN — HYDROMORPHONE HYDROCHLORIDE PRN MG: 1 INJECTION, SOLUTION INTRAMUSCULAR; INTRAVENOUS; SUBCUTANEOUS at 07:47

## 2018-05-20 RX ADMIN — CALCIUM CARBONATE (ANTACID) CHEW TAB 500 MG SCH MG: 500 CHEW TAB at 20:58

## 2018-05-20 RX ADMIN — OXYCODONE HYDROCHLORIDE SCH MG: 10 TABLET, FILM COATED, EXTENDED RELEASE ORAL at 21:02

## 2018-05-20 RX ADMIN — PANTOPRAZOLE SODIUM SCH MG: 40 TABLET, DELAYED RELEASE ORAL at 07:15

## 2018-05-20 RX ADMIN — HYDROMORPHONE HYDROCHLORIDE PRN MG: 1 INJECTION, SOLUTION INTRAMUSCULAR; INTRAVENOUS; SUBCUTANEOUS at 18:17

## 2018-05-20 RX ADMIN — POTASSIUM CHLORIDE SCH: 14.9 INJECTION, SOLUTION INTRAVENOUS at 08:46

## 2018-05-20 RX ADMIN — Medication SCH MG: at 16:53

## 2018-05-20 RX ADMIN — OXYCODONE AND ACETAMINOPHEN PRN EACH: 5; 325 TABLET ORAL at 01:23

## 2018-05-20 RX ADMIN — POTASSIUM CHLORIDE SCH: 14.9 INJECTION, SOLUTION INTRAVENOUS at 20:56

## 2018-05-20 RX ADMIN — CALCIUM CARBONATE (ANTACID) CHEW TAB 500 MG SCH MG: 500 CHEW TAB at 08:41

## 2018-05-20 RX ADMIN — SERTRALINE HYDROCHLORIDE SCH MG: 50 TABLET, FILM COATED ORAL at 08:41

## 2018-05-20 RX ADMIN — OXYCODONE HYDROCHLORIDE SCH MG: 10 TABLET, FILM COATED, EXTENDED RELEASE ORAL at 08:41

## 2018-05-20 RX ADMIN — POTASSIUM CHLORIDE SCH: 14.9 INJECTION, SOLUTION INTRAVENOUS at 01:22

## 2018-05-20 RX ADMIN — OXYCODONE AND ACETAMINOPHEN PRN EACH: 5; 325 TABLET ORAL at 23:18

## 2018-05-20 RX ADMIN — HYDROMORPHONE HYDROCHLORIDE PRN MG: 1 INJECTION, SOLUTION INTRAMUSCULAR; INTRAVENOUS; SUBCUTANEOUS at 10:53

## 2018-05-20 RX ADMIN — OXYCODONE AND ACETAMINOPHEN PRN EACH: 5; 325 TABLET ORAL at 07:13

## 2018-05-20 RX ADMIN — OXYCODONE AND ACETAMINOPHEN PRN EACH: 5; 325 TABLET ORAL at 12:26

## 2018-05-20 RX ADMIN — OXYCODONE AND ACETAMINOPHEN PRN EACH: 5; 325 TABLET ORAL at 16:51

## 2018-05-20 RX ADMIN — HYDROMORPHONE HYDROCHLORIDE PRN MG: 1 INJECTION, SOLUTION INTRAMUSCULAR; INTRAVENOUS; SUBCUTANEOUS at 14:01

## 2018-05-20 RX ADMIN — HYDROMORPHONE HYDROCHLORIDE PRN MG: 1 INJECTION, SOLUTION INTRAMUSCULAR; INTRAVENOUS; SUBCUTANEOUS at 22:11

## 2018-05-20 NOTE — P.CON
Consult Note





- .


Consult date: 05/19/18


Assessment/Plan:: 





Ms. Simpson is a pleasant 52 years old lady with past medical history of bipolar

, HTN who has been admitted to orthopedic service for open reduction and 

internal fixation of left trimalleolar ankle fracture dislocation . 








The patient sustained a work-related injury resulting in an irreducible left 

ankle fracture dislocation or 3 weeks ago.  She was initially admitted to the 

hospital had ankle spanning external fixator placed to hold her ankle reduced 

and allow her soft tissue swelling to resolve.  Eventually she was admitted for 

open reduction and internal fixation of the ankle fracture.





Patient has past medical history of hypertension, GERD, bipolar disorder.  She 

is a current every day smoker.





Patient had the procedure done on 05/18/2018 and she is postop day 1 today.  

Patient denies having any chest pain palpitations no difficulty in breathing.  

She denies having any abdominal pain nausea vomiting or diarrhea.  Patient 

denies having any hematuria or dysuria.





Review of Systems





14 point systematic review were negative except was mentioned in HPI





Past Medical History


Past Medical History: Hypertension


History of Any Multi-Drug Resistant Organisms: None Reported


Additional Past Surgical History / Comment(s): umblical surgery repair.


Past Anesthesia/Blood Transfusion Reactions: No Reported Reaction


Past Psychological History: Bipolar


Smoking Status: Current some day smoker


Past Alcohol Use History: Occasional


Past Drug Use History: None Reported





ALLERGIES - PENICILLIN 





HOME MEDICATIONS - oxycodone, hydroxyzine, Bactrim, sertraline, omeprazole, 

Seroquel, lactulose, vitamin D2 supplements, Colace, vitamin D3 supplements, 

calcium carbonate, aspirin





GENERAL EXAM











 Vital Signs











Temp  98.6 F   05/19/18 06:59


 


Pulse  69   05/19/18 06:59


 


Resp  16   05/19/18 06:59


 


BP  98/60   05/19/18 06:59


 


Pulse Ox  92 L  05/19/18 07:28








 Intake & Output











 05/18/18 05/19/18 05/19/18





 18:59 06:59 18:59


 


Intake Total 2000 3000 


 


Output Total 200  


 


Balance 1800 3000 


 


Intake:   


 


  IV 2000  


 


  Intake, IV Titration  700 





  Amount   


 


    Lactated Ringers 1,000 ml  700 





    @ 100 mls/hr IV .Q10H   





    [facility] Rx#:[id number]   


 


  Oral  2300 


 


Output:   


 


  Estimated Blood Loss 200  


 


Other:   


 


  Voiding Method  Bedside Commode Bedside Commode


 


  # Voids  3 1











GEN. APPEARANCE: alert, in no apparent distress


HEAD EXAM:  atraumatic, normocephalic, normal inspection


EYE EXAM: normal appearance, PERRL, EOMI.  Absent: scleral icterus, 

conjunctival injection, periorbital swelling


ENT EXAM:  normal exam, mucous membranes moist


NECK EXAM:  normal inspection.  Absent: tenderness, meningismus, full ROM, 

lymphadenopathy


RESPIRATORY EXAM:  normal lung sounds bilaterally.  Absent: respiratory distress

, wheezes, rales, rhonchi, stridor


CARDIOVASCULAR EXAM:  regular rate, normal rhythm, normal heart sounds.  Absent

: systolic murmur, diastolic murmur, rubs, gallop, clicks


GI/ABDOMINAL EXAM: soft, normal bowel sounds.  Absent: distended, tenderness, 

guarding, rebound, rigid


EXTREMITIES EXAM:  Left lower extremity in a splint.  Good capillary refill is 

present in her toes.


NEUROLOGICAL EXAM:  alert, oriented X3, CN II-XII intact, motor sensory deficit


PSYCHIATRIC EXAM:  normal affect, normal mood


SKIN EXAM:  warm, dry, intact, normal color.  Absent: rash











Assessment and Plan


Assessment: 





-Status post ORIF left trimalleolar ankle fracture


-History of bipolar disease, not active issue now


- GERD


- HTN


- NICOTINE DEPENDENCE


- MORBID OBESITY








Plan: 





Patient is postop day 1 today.  Her home medications have been resumed for her 

hypertension GERD and bipolar disorder.  DVT prophylaxis as per primary care 

team.  Encouraged incentive spirometry and early ambulation.

## 2018-05-20 NOTE — P.PN
Subjective


Progress Note Date: 05/20/18


Principal diagnosis: 





Status post ORIF left trimalleolar ankle fracture








Ms. Simpson is a pleasant 52 years old lady with past medical history of bipolar

, HTN who has been admitted to orthopedic service for open reduction and 

internal fixation of left trimalleolar ankle fracture dislocation . 





Patient had the procedure done on 05/18/2018 and she is postop day 2 today.  On 

reviewing labs her hemoglobin is found to be low at 7.2 today, probably acute 

blood loss during the surgery.  Patient denies having any symptoms of chest pain

, difficulty in breathing or dizziness.  She denies fatigue weakness or 

tiredness.





Review of Systems





Patient denies having any chest pain palpitations no difficulty in breathing.  

She denies having any abdominal pain nausea vomiting or diarrhea.  Patient 

denies having any hematuria or dysuria.





Objective





- Vital Signs


Vital signs: 


 Vital Signs











Temp  99.3 F   05/20/18 07:00


 


Pulse  65   05/20/18 07:00


 


Resp  17   05/20/18 07:00


 


BP  109/64   05/20/18 07:00


 


Pulse Ox  96   05/20/18 07:00








 Intake & Output











 05/19/18 05/20/18 05/20/18





 18:59 06:59 18:59


 


Intake Total 100  240


 


Balance 100  240


 


Intake:   


 


  Oral 100  240


 


Other:   


 


  Voiding Method Bedside Commode  


 


  # Voids 1 1 1














- Exam





EN. APPEARANCE: alert, in no apparent distress


HEAD EXAM:  atraumatic, normocephalic, normal inspection


EYE EXAM: Mild pallor


RESPIRATORY EXAM:  normal lung sounds bilaterally.  Absent: respiratory distress

, wheezes, rales, rhonchi, stridor


CARDIOVASCULAR EXAM:  regular rate, normal rhythm, normal heart sounds.  Absent

: systolic murmur, diastolic murmur, rubs, gallop, clicks


GI/ABDOMINAL EXAM: soft, normal bowel sounds.  Absent: distended, tenderness, 

guarding, rebound, rigid


EXTREMITIES EXAM:  Left lower extremity in a splint.  Good capillary refill is 

present in her toes.


NEUROLOGICAL EXAM:  alert, oriented X3, CN II-XII intact, motor sensory deficit


PSYCHIATRIC EXAM:  normal affect, normal mood


SKIN EXAM:  warm, dry, intact, normal color.  Absent: rash











- Labs


CBC & Chem 7: 


 05/20/18 06:59





Labs: 


 Abnormal Lab Results - Last 24 Hours (Table)











  05/20/18 Range/Units





  06:59 


 


RBC  2.41 L  (3.80-5.40)  m/uL


 


Hgb  7.2 L  (11.4-16.0)  gm/dL


 


Hct  21.9 L  (34.0-46.0)  %














Assessment and Plan


Assessment: 


ASSESSMENT








Status post ORIF left trimalleolar ankle fracture


- Acute blood loss anemia


-History of bipolar disease, not active issue now


- GERD


- HTN


- NICOTINE DEPENDENCE


- MORBID OBESITY








Plan: 





Patient is postop day 2 today.  We will check CBC for tomorrow morning and if 

less than 7 will require blood transfusion.  Her home medications have been 

resumed for her hypertension GERD and bipolar disorder.  DVT prophylaxis as per 

primary care team.  Encouraged incentive spirometry and early ambulation.

## 2018-05-21 VITALS — SYSTOLIC BLOOD PRESSURE: 107 MMHG | TEMPERATURE: 98.8 F | DIASTOLIC BLOOD PRESSURE: 69 MMHG | HEART RATE: 61 BPM

## 2018-05-21 VITALS — RESPIRATION RATE: 16 BRPM

## 2018-05-21 LAB
ANION GAP SERPL CALC-SCNC: 9 MMOL/L
BASOPHILS # BLD AUTO: 0 K/UL (ref 0–0.2)
BASOPHILS NFR BLD AUTO: 0 %
BUN SERPL-SCNC: 7 MG/DL (ref 7–17)
CALCIUM SPEC-MCNC: 8.7 MG/DL (ref 8.4–10.2)
CHLORIDE SERPL-SCNC: 103 MMOL/L (ref 98–107)
CO2 SERPL-SCNC: 28 MMOL/L (ref 22–30)
EOSINOPHIL # BLD AUTO: 0.2 K/UL (ref 0–0.7)
EOSINOPHIL NFR BLD AUTO: 4 %
ERYTHROCYTE [DISTWIDTH] IN BLOOD BY AUTOMATED COUNT: 2.45 M/UL (ref 3.8–5.4)
ERYTHROCYTE [DISTWIDTH] IN BLOOD: 14 % (ref 11.5–15.5)
GLUCOSE SERPL-MCNC: 95 MG/DL (ref 74–99)
HCT VFR BLD AUTO: 22 % (ref 34–46)
HGB BLD-MCNC: 7.1 GM/DL (ref 11.4–16)
LYMPHOCYTES # SPEC AUTO: 1.6 K/UL (ref 1–4.8)
LYMPHOCYTES NFR SPEC AUTO: 42 %
MCH RBC QN AUTO: 28.7 PG (ref 25–35)
MCHC RBC AUTO-ENTMCNC: 32 G/DL (ref 31–37)
MCV RBC AUTO: 89.7 FL (ref 80–100)
MONOCYTES # BLD AUTO: 0.4 K/UL (ref 0–1)
MONOCYTES NFR BLD AUTO: 9 %
NEUTROPHILS # BLD AUTO: 1.7 K/UL (ref 1.3–7.7)
NEUTROPHILS NFR BLD AUTO: 43 %
PLATELET # BLD AUTO: 227 K/UL (ref 150–450)
POTASSIUM SERPL-SCNC: 4.2 MMOL/L (ref 3.5–5.1)
SODIUM SERPL-SCNC: 140 MMOL/L (ref 137–145)
WBC # BLD AUTO: 3.9 K/UL (ref 3.8–10.6)

## 2018-05-21 RX ADMIN — POTASSIUM CHLORIDE SCH: 14.9 INJECTION, SOLUTION INTRAVENOUS at 07:51

## 2018-05-21 RX ADMIN — CALCIUM CARBONATE (ANTACID) CHEW TAB 500 MG SCH MG: 500 CHEW TAB at 07:50

## 2018-05-21 RX ADMIN — SERTRALINE HYDROCHLORIDE SCH MG: 50 TABLET, FILM COATED ORAL at 07:51

## 2018-05-21 RX ADMIN — OXYCODONE AND ACETAMINOPHEN PRN EACH: 5; 325 TABLET ORAL at 04:22

## 2018-05-21 RX ADMIN — OXYCODONE AND ACETAMINOPHEN PRN EACH: 5; 325 TABLET ORAL at 10:55

## 2018-05-21 RX ADMIN — CALCIUM CARBONATE (ANTACID) CHEW TAB 500 MG SCH MG: 500 CHEW TAB at 15:42

## 2018-05-21 RX ADMIN — Medication SCH UNIT: at 07:50

## 2018-05-21 RX ADMIN — OXYCODONE HYDROCHLORIDE SCH MG: 10 TABLET, FILM COATED, EXTENDED RELEASE ORAL at 07:49

## 2018-05-21 RX ADMIN — ENOXAPARIN SODIUM SCH MG: 40 INJECTION SUBCUTANEOUS at 07:50

## 2018-05-21 RX ADMIN — POTASSIUM CHLORIDE SCH: 14.9 INJECTION, SOLUTION INTRAVENOUS at 01:13

## 2018-05-21 RX ADMIN — Medication SCH MG: at 07:51

## 2018-05-21 RX ADMIN — PANTOPRAZOLE SODIUM SCH MG: 40 TABLET, DELAYED RELEASE ORAL at 07:51

## 2018-05-21 RX ADMIN — OXYCODONE AND ACETAMINOPHEN PRN EACH: 5; 325 TABLET ORAL at 16:01

## 2018-05-21 NOTE — P.PN
Subjective


Progress Note Date: 05/21/18


Principal diagnosis: 





Status post ORIF left trimalleolar ankle fracture








Ms. Simpson is a pleasant 52 years old lady with past medical history of bipolar

, HTN who has been admitted to orthopedic service for open reduction and 

internal fixation of left trimalleolar ankle fracture dislocation . 





Patient had the procedure done on 05/18/2018 and she is postop day 2 today.  On 

reviewing labs her hemoglobin is found to be low at 7.2 today, probably acute 

blood loss during the surgery.  Patient denies having any symptoms of chest pain

, difficulty in breathing or dizziness.  She denies fatigue weakness or 

tiredness.





Patient denies having any chest pain palpitations no difficulty in breathing.  

She denies having any abdominal pain nausea vomiting or diarrhea.  Patient 

denies having any hematuria or dysuria.





And she is being discharged today to a subacute rehab facility.





Objective





- Vital Signs


Vital signs: 


 Vital Signs











Temp  98.9 F   05/21/18 07:45


 


Pulse  94   05/21/18 07:45


 


Resp  16   05/21/18 07:45


 


BP  146/76   05/21/18 07:45


 


Pulse Ox  95   05/21/18 07:45








 Intake & Output











 05/20/18 05/21/18 05/21/18





 18:59 06:59 18:59


 


Intake Total 360  


 


Output Total  1800 


 


Balance 360 -1800 


 


Intake:   


 


  Oral 360  


 


Output:   


 


  Urine  1800 


 


Other:   


 


  Voiding Method   Bedside Commode


 


  # Voids 1 2 1














- Exam





EN. APPEARANCE: alert, in no apparent distress


HEAD EXAM:  atraumatic, normocephalic, normal inspection


EYE EXAM: Mild pallor


RESPIRATORY EXAM:  normal lung sounds bilaterally.  Absent: respiratory distress

, wheezes, rales, rhonchi, stridor


CARDIOVASCULAR EXAM:  regular rate, normal rhythm, normal heart sounds.  Absent

: systolic murmur, diastolic murmur, rubs, gallop, clicks


GI/ABDOMINAL EXAM: soft, normal bowel sounds.  Absent: distended, tenderness, 

guarding, rebound, rigid


EXTREMITIES EXAM:  Left lower extremity in a splint.  Good capillary refill is 

present in her toes.


NEUROLOGICAL EXAM:  alert, oriented X3, CN II-XII intact, motor sensory deficit


PSYCHIATRIC EXAM:  normal affect, normal mood


SKIN EXAM:  warm, dry, intact, normal color. 











- Labs


CBC & Chem 7: 


 05/21/18 06:44





 05/21/18 06:44


Labs: 


 Abnormal Lab Results - Last 24 Hours (Table)











  05/21/18 Range/Units





  06:44 


 


RBC  2.45 L  (3.80-5.40)  m/uL


 


Hgb  7.1 L  (11.4-16.0)  gm/dL


 


Hct  22.0 L  (34.0-46.0)  %














Assessment and Plan


Assessment: 


ASSESSMENT








Status post ORIF left trimalleolar ankle fracture


- Acute blood loss anemia


-History of bipolar disease, not active issue now


- GERD


- HTN


- NICOTINE DEPENDENCE


- MORBID OBESITY








Plan: 





Patient is postop day 3 today.  Patient's hemoglobin has been stable around 7.  

Patient's medications have been reconciled.  She is to continue on her home 

medications and the new addition is only iron supplements and she had blood 

loss anemia.  No weightbearing in the left lower extremity.  DVT management and 

pain management as per primary care team. Continue with incentive spirometry.

## 2018-05-21 NOTE — P.DS
Providers


Date of admission: 


05/18/18 12:51





Expected date of discharge: 05/21/18


Attending physician: 


Gianfranco Spencer





Consults: 





 





05/18/18 17:36


Consult Physician Routine 


   Consulting Provider: Sam Pop


   Consult Reason/Comments: medical management


   Do you want consulting provider notified?: Yes





05/18/18 18:59


Consult Physician Routine 


   Consulting Provider: Drew Betancur


   Consult Reason/Comments: medical management


   Do you want consulting provider notified?: Yes











Primary care physician: 


Donn Vargas








- Discharge Diagnosis(es)


(1) Ankle fracture


Patient was admitted to the OR on 5/18/2018 to undergo ORIF of left 

trimalleolar ankle fracture. She had failed conservative measures as an 

outpatient and desired to proceed with elective surgery after given informed 

consent.  She underwent the above procedure which he tolerated well without 

complication.  Postoperative hospital course has remained without complication.

  On day of discharge she is afebrile, vital signs stable, labs within 

acceptable ranges, tolerating by mouth meds and diet, voiding without difficulty

, positive flatus, denies abdominal pain or calf pain, pain is controlled on 

oral pain medication and has no new complaints.  Wound is benign, neurovascular 

status is intact, calf is soft and nontender, abdomen soft and nontender.  

Review of systems is negative for numbness, tingling, fever, chills, chest pain

, shortness breath, nausea, vomiting, dizziness, headaches, slurred speech or 

other.


Current Visit: No   Status: Acute   Priority: Medium   


Procedures: 


ORIF left ankle fracture





Patient Condition at Discharge: Good





Plan - Discharge Summary


Discharge Rx Participant: No


New Discharge Prescriptions: 


New


   Aspirin 325 mg PO BID #60 tab


   Docusate [Colace] 100 mg PO BID #60 capsule


   oxyCODONE ER [OxyCONTIN] 10 mg PO Q12HR #14 tab


   oxyCODONE HCL/ACETAMINOPHEN [Percocet 5-325 mg] 1 tab PO Q4HR PRN #30 tab


     PRN Reason: Pain





No Action


   Omeprazole [PriLOSEC] 20 mg PO AC-BID PRN


     PRN Reason: Heartburn


   Sertraline [Zoloft] 150 mg PO DAILY


   QUEtiapine FUMARATE [SEROquel] 200 mg PO HS


   hydrOXYzine PAMOATE [Vistaril] 25 mg PO BID


   Aspirin 325 mg PO BID #60 tab


   Docusate [Colace] 100 mg PO BID #60 capsule


   Cholecalciferol [Vitamin D3] 2,000 unit PO DAILY@1200  tab


   Ergocalciferol [Vitamin D2 (DRISDOL)] 50,000 unit PO Q72H  cap


   Calcium Carbonate [Tums] 500 mg PO TID 30 Days #30 chewable


   oxyCODONE HCL/ACETAMINOPHEN [Percocet 5-325 mg] 1 tab PO Q4HR PRN


     PRN Reason: Mild To Moderate Pain


   Sulfamethox-Tmp 800-160Mg [Bactrim -160 mg] 1 tab PO Q12HR


   oxyCODONE HCL [OxyCONTIN] 10 mg PO BID


   Lactulose 10 gm PO BID


Discharge Medication List





Omeprazole [PriLOSEC] 20 mg PO AC-BID PRN 01/06/18 [History]


QUEtiapine FUMARATE [SEROquel] 200 mg PO HS 04/24/18 [History]


Sertraline [Zoloft] 150 mg PO DAILY 04/24/18 [History]


hydrOXYzine PAMOATE [Vistaril] 25 mg PO BID 04/24/18 [History]


Aspirin 325 mg PO BID #60 tab 04/30/18 [Rx]


Calcium Carbonate [Tums] 500 mg PO TID 30 Days #30 chewable 04/30/18 [Rx]


Cholecalciferol [Vitamin D3] 2,000 unit PO DAILY@1200  tab 04/30/18 [Rx]


Docusate [Colace] 100 mg PO BID #60 capsule 04/30/18 [Rx]


Ergocalciferol [Vitamin D2 (DRISDOL)] 50,000 unit PO Q72H  cap 04/30/18 [Rx]


Lactulose 10 gm PO BID 05/18/18 [History]


Sulfamethox-Tmp 800-160Mg [Bactrim -160 mg] 1 tab PO Q12HR 05/18/18 [

History]


oxyCODONE HCL [OxyCONTIN] 10 mg PO BID 05/18/18 [History]


oxyCODONE HCL/ACETAMINOPHEN [Percocet 5-325 mg] 1 tab PO Q4HR PRN 05/18/18 [

History]


Aspirin 325 mg PO BID #60 tab 05/21/18 [Rx]


Docusate [Colace] 100 mg PO BID #60 capsule 05/21/18 [Rx]


oxyCODONE ER [OxyCONTIN] 10 mg PO Q12HR #14 tab 05/21/18 [Rx]


oxyCODONE HCL/ACETAMINOPHEN [Percocet 5-325 mg] 1 tab PO Q4HR PRN #30 tab 05/21/ 18 [Rx]








Follow up Appointment(s)/Referral(s): 


Gianfranco Spencer MD [Medical Doctor] - 2 Weeks


Activity/Diet/Wound Care/Special Instructions: 


1. Strict Non-weight bearing on your operative extremity


2. Keep splint clean and dry, do not remove your splint


3. Keep your leg elevated as much as possible to help with swelling and pain 

control


4. Take pain medications as directed


5. Work towards smoking cessation to help with soft-tissue and fracture healing


6. Use crutches or a knee scooter to ambulate


7. Follow-up in 2 weeks for splint removal and x-rays.


Discharge Disposition: TRANSFER TO SNF/ECF

## 2019-08-07 ENCOUNTER — HOSPITAL ENCOUNTER (OUTPATIENT)
Dept: HOSPITAL 47 - RADMAMWWP | Age: 54
Discharge: HOME | End: 2019-08-07
Attending: INTERNAL MEDICINE
Payer: COMMERCIAL

## 2019-08-07 DIAGNOSIS — R92.8: Primary | ICD-10-CM

## 2019-08-07 PROCEDURE — 77066 DX MAMMO INCL CAD BI: CPT

## 2019-08-07 PROCEDURE — 77062 BREAST TOMOSYNTHESIS BI: CPT

## 2019-08-07 NOTE — MM
Reason for exam: additional evaluation requested from prior study.

Last mammogram was performed 1 year and 4 months ago.



History:

Family history of breast cancer in paternal aunt and breast cancer in paternal 

grandmother.



Physical Findings:

Nurse did not find any significant physical abnormalities on exam.



MG 3D Diag Mammo W/Cad LORI

Bilateral CC and MLO view(s) were taken.

Prior study comparison: April 20, 2018, bilateral MG 3d diag mammo w/cad LORI.  

October 16, 2017, bilateral MG work up mamm w CAD BILAT.

The breast tissue is heterogeneously dense. This may lower the sensitivity of 

mammography.  No suspicious abnormality.

No significant new findings when compared with previous films.



These results were verbally communicated with the patient and result sheet given 

to the patient on 8/7/19.





ASSESSMENT: Negative, BI-RAD 1



RECOMMENDATION:

Routine screening mammogram of both breasts in 1 year.

## 2019-08-27 ENCOUNTER — HOSPITAL ENCOUNTER (OUTPATIENT)
Dept: HOSPITAL 47 - LABWHC1 | Age: 54
Discharge: HOME | End: 2019-08-27
Attending: PHYSICIAN ASSISTANT
Payer: COMMERCIAL

## 2019-08-27 DIAGNOSIS — R74.8: Primary | ICD-10-CM

## 2019-08-27 LAB
ALBUMIN SERPL-MCNC: 4.1 G/DL (ref 3.8–4.9)
ALBUMIN/GLOB SERPL: 1.71 G/DL (ref 1.6–3.17)
ALP SERPL-CCNC: 85 U/L (ref 41–126)
ALT SERPL-CCNC: 53 U/L (ref 8–44)
AST SERPL-CCNC: 50 U/L (ref 13–35)
BILIRUB INDIRECT SERPL-MCNC: 0.4 MG/DL
ERYTHROCYTE [DISTWIDTH] IN BLOOD BY AUTOMATED COUNT: 4.23 M/UL (ref 3.8–5.4)
ERYTHROCYTE [DISTWIDTH] IN BLOOD: 13.8 % (ref 11.5–15.5)
GLOBULIN SER CALC-MCNC: 2.4 G/DL (ref 1.6–3.3)
HBA1C MFR BLD: 5.3 % (ref 4–6)
HCT VFR BLD AUTO: 38.7 % (ref 34–46)
HGB BLD-MCNC: 13.5 GM/DL (ref 11.4–16)
INR PPP: 0.9 (ref ?–1.2)
MCH RBC QN AUTO: 31.9 PG (ref 25–35)
MCHC RBC AUTO-ENTMCNC: 34.8 G/DL (ref 31–37)
MCV RBC AUTO: 91.7 FL (ref 80–100)
PLATELET # BLD AUTO: 313 K/UL (ref 150–450)
PROT SERPL-MCNC: 6.5 G/DL (ref 6.2–8.2)
PT BLD: 10 SEC (ref 9–12)
WBC # BLD AUTO: 5.2 K/UL (ref 3.8–10.6)

## 2019-08-27 PROCEDURE — 85610 PROTHROMBIN TIME: CPT

## 2019-08-27 PROCEDURE — 82728 ASSAY OF FERRITIN: CPT

## 2019-08-27 PROCEDURE — 83540 ASSAY OF IRON: CPT

## 2019-08-27 PROCEDURE — 83550 IRON BINDING TEST: CPT

## 2019-08-27 PROCEDURE — 82390 ASSAY OF CERULOPLASMIN: CPT

## 2019-08-27 PROCEDURE — 85027 COMPLETE CBC AUTOMATED: CPT

## 2019-08-27 PROCEDURE — 82103 ALPHA-1-ANTITRYPSIN TOTAL: CPT

## 2019-08-27 PROCEDURE — 83516 IMMUNOASSAY NONANTIBODY: CPT

## 2019-08-27 PROCEDURE — 36415 COLL VENOUS BLD VENIPUNCTURE: CPT

## 2019-08-27 PROCEDURE — 86038 ANTINUCLEAR ANTIBODIES: CPT

## 2019-08-27 PROCEDURE — 80076 HEPATIC FUNCTION PANEL: CPT

## 2019-08-27 PROCEDURE — 83036 HEMOGLOBIN GLYCOSYLATED A1C: CPT

## 2022-10-19 ENCOUNTER — HOSPITAL ENCOUNTER (OUTPATIENT)
Dept: HOSPITAL 47 - RADMAMWWP | Age: 57
Discharge: HOME | End: 2022-10-19
Attending: FAMILY MEDICINE
Payer: COMMERCIAL

## 2022-10-19 DIAGNOSIS — Z80.3: ICD-10-CM

## 2022-10-19 DIAGNOSIS — Z12.31: Primary | ICD-10-CM

## 2022-10-19 PROCEDURE — 77067 SCR MAMMO BI INCL CAD: CPT

## 2022-10-20 NOTE — MM
Reason for Exam: Screening  (asymptomatic). 

Last mammogram was performed 3 year(s) and 2 month(s) ago. 





Patient History: 

Menarche at age 16. First Full-Term Pregnancy at age 28. Patient has history of breast feeding.

Paternal grandmother had breast cancer, age 55. Paternal aunt had breast cancer, age 66.

Last menstrual period: 09/23/2022





Risk Values: 

Arabella 5 year model risk: 1.2%.

NCI Lifetime model risk: 8.1%.





Prior Study Comparison: 

10/16/2017 Bilateral Diagnostic Mammogram, [facility]. 4/20/2018 Bilateral Diagnostic Mammogram, [facility].

8/7/2019 Bilateral Diagnostic Mammogram, [facility]. 





Tissue Density: 

The breast tissue is heterogeneously dense. This may lower the sensitivity of mammography.





Findings: 

Analyzed By CAD. 

There is no suspicious group of microcalcifications or new suspicious mass in either breast. Chronic

nodularity bilaterally. No significant change from prior exams. 





Overall Assessment: Benign, BI-RAD 2





Management: 

Screening Mammogram of both breasts in 1 year.

A clinical breast exam by your physician is recommended on an annual basis and results should be

correlated with mammographic findings.



Electronically signed and approved by: Félix Welch D.O.

## 2022-10-20 NOTE — MM
Reason for Exam: Screening  (asymptomatic). 

Last mammogram was performed 3 year(s) and 2 month(s) ago. 





Patient History: 

Menarche at age 16. First Full-Term Pregnancy at age 28. Patient has history of breast feeding.

Paternal grandmother had breast cancer, age 55. Paternal aunt had breast cancer, age 66.

Last menstrual period: 09/23/2022





Risk Values: 

Arabella 5 year model risk: 1.2%.

NCI Lifetime model risk: 8.1%.





Prior Study Comparison: 

10/16/2017 Bilateral Diagnostic Mammogram, [facility]. 4/20/2018 Bilateral Diagnostic Mammogram, [facility].

8/7/2019 Bilateral Diagnostic Mammogram, [facility]. 





Tissue Density: 

The breast tissue is heterogeneously dense. This may lower the sensitivity of mammography.





Findings: 

Analyzed By CAD. 

There is no suspicious group of microcalcifications or new suspicious mass in either breast. Chronic

nodularity bilaterally. No significant change from prior exams. 





Overall Assessment: Benign, BI-RAD 2





Management: 

Screening Mammogram of both breasts in 1 year.

A clinical breast exam by your physician is recommended on an annual basis and results should be

correlated with mammographic findings.



Electronically signed and approved by: Félix Welch D.O.

## 2023-06-30 ENCOUNTER — HOSPITAL ENCOUNTER (OUTPATIENT)
Dept: HOSPITAL 47 - RADUSWWP | Age: 58
Discharge: HOME | End: 2023-06-30
Attending: FAMILY MEDICINE
Payer: COMMERCIAL

## 2023-06-30 DIAGNOSIS — N83.202: Primary | ICD-10-CM

## 2023-06-30 DIAGNOSIS — R93.89: ICD-10-CM

## 2023-06-30 DIAGNOSIS — N92.1: ICD-10-CM

## 2023-06-30 DIAGNOSIS — N64.59: ICD-10-CM

## 2023-06-30 PROCEDURE — 76830 TRANSVAGINAL US NON-OB: CPT

## 2023-07-02 NOTE — US
EXAMINATION TYPE: US transvaginal

 

DATE OF EXAM: 6/30/2023

 

COMPARISON: US 2013

 

CLINICAL INDICATION: Female, 57 years old with history of N92.1 EXCESSIVE AND FREQUENT MENSTRUATION W
IT N94.6 DYSMENOR; Heavy painful irregular periods

 

TECHNIQUE: Transvaginal exam only

 

Date of LMP:  06/23/2023

 

EXAM MEASUREMENTS:

 

Uterus:  10.1 x 5.0 x 5.9 cm

Endometrial Stripe: 1.0 cm

Right Ovary:  not seen 

Left Ovary:  3.0 x 1.9 x 1.9 cm

 

 

 

1. Uterus:  anteverted, heterogeneous, multiple nabothian cysts

2. Endometrium:  thickened for patient's LMP

3. Right Ovary:  not seen

4. Left Ovary:  2 cysts with largest measuring 1.5cm

5. Bilateral Adnexa:  wnl

6. Posterior cul-de-sac:  wnl

 

 

 

IMPRESSION: 

1. Thickened endometrium for the menstrual cycle stage.

2. Left ovarian cysts follow-up is recommended.

## 2025-01-21 ENCOUNTER — HOSPITAL ENCOUNTER (OUTPATIENT)
Dept: HOSPITAL 47 - RADCTMAIN | Age: 60
Discharge: HOME | End: 2025-01-21
Attending: FAMILY MEDICINE
Payer: MEDICARE

## 2025-01-21 DIAGNOSIS — I70.0: ICD-10-CM

## 2025-01-21 DIAGNOSIS — R31.9: Primary | ICD-10-CM

## 2025-01-21 DIAGNOSIS — R91.1: ICD-10-CM

## 2025-01-21 PROCEDURE — 74160 CT ABDOMEN W/CONTRAST: CPT

## 2025-01-21 NOTE — CT
EXAMINATION TYPE: CT abdomen w con

 

DATE OF EXAM: 1/21/2025 3:33 PM

 

COMPARISON: None. 

 

CLINICAL INDICATION: Female, 59 years old with history of R91.1 SOLITARY PULMONARY NODULE, Hematuria 
x1mo.

 

TECHNIQUE: Axial images were obtained from above the diaphragm to the iliac crests in the axial plane
 at 5 mm thick sections.  Reconstructed images are reviewed on the computer in the coronal plane.  

CONTRAST:  100 ml mL of Isovue 300. Study performed with Oral Contrast

DLP: 1167 mGycm, Automated exposure control for dose reduction was used.

 

FINDINGS:

 

Limited CT sections are obtained the lung bases.  The lung bases are clear.

 

CT ABDOMEN:

 

Liver: Normal

 

Spleen: Normal. A splenule is in the splenic hilum.

 

Pancreas: Normal

 

Adrenal glands: The adrenal glands are normal.

 

Gallbladder: Normal  

 

Kidneys: No masses are evident. No hydronephrosis is present.   No cysts are present.  Delayed images
 were obtained through the kidneys, which remain unremarkable. No renal or ureteral stones within the
 field-of-view are evident.

 

Aorta: Vascular calcification is within the aorta. 

 

Inferior vena cava: Normal.

 

CT PELVIS: 

Structures within the upper pelvis within the field-of-view appear unremarkable. Appendix appears nor
mal. Portion of the uterus and adnexa visualized are normal. Superior portion the urinary bladder is 
normal. A few diverticular changes are within the sigmoid colon. No acute diverticulitis evident. 

 

IMPRESSION:

1.  No suspicious abnormalities to account for hematuria.

 

[facility], Workstation: [id number], 1/21/2025 5:41 PM